# Patient Record
Sex: MALE | URBAN - METROPOLITAN AREA
[De-identification: names, ages, dates, MRNs, and addresses within clinical notes are randomized per-mention and may not be internally consistent; named-entity substitution may affect disease eponyms.]

---

## 2017-03-06 ENCOUNTER — TELEPHONE (OUTPATIENT)
Dept: NEUROLOGY | Facility: CLINIC | Age: 67
End: 2017-03-06

## 2017-03-06 NOTE — TELEPHONE ENCOUNTER
Called patient back and LVM stating there is nothing available the week of 5/1-5/5 but that I would hold an appointment for him on 5/9 at 2:10. I asked that they return the call by tomorrow or I will need to release it.

## 2017-03-06 NOTE — TELEPHONE ENCOUNTER
----- Message from Viridiana Lemons sent at 3/6/2017  2:24 PM CST -----  Regarding: Pt and wife calling about apt issues  Contact: [phone]  Pt and Pt wife calling about 5/1/17 apt with Dr Jung. Per pt wife they are traveling from [place] and with the travel will most likley not make the apt on 5/1/17. Pt wife asked if there was any way Dr. Jung would be able to see them on 5/2/17-5/5/17. FYI  I did let the pt and pt wife know that Dr. Jung was book for all those days but they insisted a message be sent over due to them traveling. Pt and wife are asking for a call back.   Pt and wife can be reached at [phone]    Thank You,  Viridiana    Please DO NOT send this message and/or reply back to sender.  Call Center Representatives DO NOT respond to messages.

## 2017-03-10 NOTE — TELEPHONE ENCOUNTER
This morning I noted there is an appointment on hold today with Dr. Jung at 2:00 pm. LVM on patient's phone asking to call and confirm or cancel that appointment if he is not planning on coming. Also LVM on cell phone as well.

## 2018-02-27 ENCOUNTER — OFFICE VISIT (OUTPATIENT)
Dept: PODIATRY | Facility: CLINIC | Age: 68
End: 2018-02-27
Payer: MEDICARE

## 2018-02-27 VITALS
HEIGHT: 69 IN | WEIGHT: 165 LBS | HEART RATE: 64 BPM | SYSTOLIC BLOOD PRESSURE: 138 MMHG | BODY MASS INDEX: 24.44 KG/M2 | DIASTOLIC BLOOD PRESSURE: 89 MMHG

## 2018-02-27 DIAGNOSIS — I73.9 PVD (PERIPHERAL VASCULAR DISEASE): ICD-10-CM

## 2018-02-27 DIAGNOSIS — I87.2 VENOUS INSUFFICIENCY OF BOTH LOWER EXTREMITIES: Primary | ICD-10-CM

## 2018-02-27 DIAGNOSIS — G20.A1 PARKINSON'S DISEASE: ICD-10-CM

## 2018-02-27 DIAGNOSIS — B35.1 ONYCHOMYCOSIS: ICD-10-CM

## 2018-02-27 PROCEDURE — 99203 OFFICE O/P NEW LOW 30 MIN: CPT | Mod: 25,S$PBB,, | Performed by: PODIATRIST

## 2018-02-27 PROCEDURE — 99999 PR PBB SHADOW E&M-NEW PATIENT-LVL III: CPT | Mod: PBBFAC,,, | Performed by: PODIATRIST

## 2018-02-27 PROCEDURE — 11721 DEBRIDE NAIL 6 OR MORE: CPT | Mod: PBBFAC,PO | Performed by: PODIATRIST

## 2018-02-27 PROCEDURE — 99203 OFFICE O/P NEW LOW 30 MIN: CPT | Mod: PBBFAC,PO | Performed by: PODIATRIST

## 2018-02-27 NOTE — PROGRESS NOTES
"Subjective:      Patient ID: Kolton Crystal is a 67 y.o. male.    Chief Complaint: No chief complaint on file.    Kolton is a 67 y.o. male who presents to the clinic for evaluation and treatment of high risk feet. Kolton has no past medical history on file. Pt with hx of parkinson's disease.The patient's chief complaint is long, thick toenails. This patient has documented high risk feet requiring routine maintenance secondary to peripheral vascular disease. And neuropathy. Denies pain. No other pedal complaints at si time.     PCP: Primary Doctor No  Melina Weldon MD  Date Last Seen by PCP: 2/20/18    Current shoe gear:  Affected Foot: Casual shoes     Unaffected Foot: Casual shoes    Last encounter in this department: Visit date not found    No results found for: HGBA1C    Vitals:    02/27/18 0820   BP: 138/89   Pulse: 64   Weight: 74.8 kg (165 lb)   Height: 5' 9" (1.753 m)   PainSc: 0-No pain      No past medical history on file.    No past surgical history on file.    No family history on file.    Social History     Social History    Marital status:      Spouse name: N/A    Number of children: N/A    Years of education: N/A     Social History Main Topics    Smoking status: Not on file    Smokeless tobacco: Not on file    Alcohol use Not on file    Drug use: Unknown    Sexual activity: Not on file     Other Topics Concern    Not on file     Social History Narrative    No narrative on file       No current outpatient prescriptions on file.     No current facility-administered medications for this visit.        Review of patient's allergies indicates:  No Known Allergies    Review of Systems   Skin: Positive for dry skin, nail changes and unusual hair distribution.   All other systems reviewed and are negative.          Objective:      Physical Exam   Constitutional: He is oriented to person, place, and time. No distress.   Cardiovascular:   Pulses:       Dorsalis pedis pulses are 0 on the right " side, and 0 on the left side.        Posterior tibial pulses are 1+ on the right side, and 1+ on the left side.   DP pulse absent, bisi. PT pulse 1/4, bisi. CRT 2- 3 seconds to digits 1-5,bisi. Hair is absent to foot/leg, bisi. Skin temperature is warm to cool, bisi foot.   Musculoskeletal: He exhibits no edema or tenderness.   Hallux limitus bilateral foot.    No pain with ROM or MMT bilateral foot.       Neurological: He is alert and oriented to person, place, and time. A sensory deficit is present.   Sensation intact to digits via SWMF. Vibratory sensation diminished, bisi   Skin: Skin is warm, dry and intact. Capillary refill takes 2 to 3 seconds. No bruising, no ecchymosis and no rash noted. He is not diaphoretic. No cyanosis or erythema. Nails show no clubbing.   Skin is warm to cool bilateral foot and very dry, bisi. No open lesions or SOI noted, bisi.  Nails are elongated 3-4 mm and dystrophic/discolored yellow 1-5, bisi.     No open lesions or macerations bilateral lower extremity.    Skin is thin and atrophied bilateral lower extremity.       Psychiatric: He has a normal mood and affect. His behavior is normal. Judgment and thought content normal.             Assessment:       Encounter Diagnoses   Name Primary?    Venous insufficiency of both lower extremities Yes    PVD (peripheral vascular disease)     Parkinson's disease     Onychomycosis          Plan:       Diagnoses and all orders for this visit:    Venous insufficiency of both lower extremities  -     Foot Care    PVD (peripheral vascular disease)  -     Foot Care    Parkinson's disease    Onychomycosis  -     Foot Care      I counseled the patient on his conditions, their implications and medical management.    - Shoe inspection. Patient instructed on proper foot hygeine. We discussed wearing proper shoe gear, daily foot inspections, never walking without protective shoe gear, never putting sharp instruments to feet, routine podiatric nail visits every  2-3 months.      - With patient's permission, nails were aggressively reduced and debrided x 10 without incident, see procedure note.     - Pt to RTC in 3 mo for routine foot care    Shea Pagan, PGY2    I have personally taken the history and examined this patient and agree with the resident's note as stated as above.   Addison Gardiner DPM, FACFAS

## 2018-02-27 NOTE — PROCEDURES
"Routine Foot Care  Date/Time: 2/27/2018 8:43 AM  Performed by: RAKEL KEBEDE  Authorized by: RAKEL KEBEDE     Time out: Immediately prior to procedure a "time out" was called to verify the correct patient, procedure, equipment, support staff and site/side marked as required.    Consent Done?:  Yes (Verbal)    Nail Care Type:  Debride  Location(s): All  (Left 1st Toe, Left 3rd Toe, Left 2nd Toe, Left 4th Toe, Left 5th Toe, Right 1st Toe, Right 2nd Toe, Right 3rd Toe, Right 4th Toe and Right 5th Toe)  Patient tolerance:  Patient tolerated the procedure well with no immediate complications      "

## 2018-02-28 ENCOUNTER — TELEPHONE (OUTPATIENT)
Dept: PODIATRY | Facility: CLINIC | Age: 68
End: 2018-02-28

## 2018-02-28 NOTE — TELEPHONE ENCOUNTER
----- Message from Tracy Dolan sent at 2/28/2018  3:42 PM CST -----  Contact: wife Genesis [phone]  Patient's spouse is returning your call. Please advise.

## 2018-06-06 ENCOUNTER — RECORDS - HEALTHEAST (OUTPATIENT)
Dept: LAB | Facility: CLINIC | Age: 68
End: 2018-06-06

## 2018-06-07 LAB
25(OH)D3 SERPL-MCNC: 14.6 NG/ML (ref 30–80)
MAGNESIUM SERPL-MCNC: 2.1 MG/DL (ref 1.8–2.6)
PSA SERPL-MCNC: 2 NG/ML (ref 0–4.5)
VIT B12 SERPL-MCNC: 294 PG/ML (ref 213–816)

## 2018-06-12 NOTE — PROGRESS NOTES
Summary and Recommendations:     Parkinson disease with dementia - seen initially by me in . Duration of PD is at least 7-8 yrs.   Both parents probably had parkinson  No one else in family has manifested with parkinson  There is a history of psychotic disorder in a brother who committed suicide and two sisters had breast cancer    Mobility wise he is fairly functional based on his gait and movement with his present sinemet medication regimen of 2 tabs 3/day    His cognition is affected with bradyphrenia - slowed responses and he also has memory impairment. He appears to have dementia and this may be parkinson disease with dementia (PDD) vs parkinson with alzheimer - mixed condition. PDD vs lewy body disease is a somewhat arbitrary separation and not necessarily that important in management    His behavior has improved from when he was living in [facility] and where he is now there are not these issues    His hallucinations persist but are not disabling and he is not on seroquel (quetiapine) or nuplazid (pimavanserin).  We discussed these medications briefly but will not prescribe either of them. Nonetheless both can affect the QTc interval so will get an ECG today for baseline QTc.    He had a spell of intermittent atrial fibrillation - and had monitoring in [facility] - they have not had a followup on this with cardiology at the [facility] or at the . Will get an ECG today.     Signed up his wife Jesica Bates for proxy access to his medical records. Silvino lives in [facility] and she lives in [facility] and visits twice monthly.     They wish to come back for ongoing care.       Arash Jung MD  _____________________________________________________________________  PATIENT: Silvino Bates  67 year old male   : 1950  ZACH: 2018    Consult requested by patient/other  Outside records reviewed and revealed  - inserted.   History obtained from  patient  History of Present Illness  68 yo man with parkinson  Previously seen by me in 2012  Has seen by Julia  [facility]     Parkinson's disease - seen initially by jairo 7/15/2011 and had been diagnosed with PD by Dr. morrison in 2011 or so.  He had tremor as the most disabling problem at the time he was initially seen    Since the initial evaluation and over the past year or two  He has had a fall out of bed and was scanned as he hit his head.   He was having lots of hallucinations -   He has some now  He is sleeping better with remeron  He has had diarrhea from c diff and is on medication for this.   As a result he is no longer on medications for constipation    Ros  Reading glasses  Hearing is not very good - will be seeing audiology coming up and it is time for new hearing aides.   Doing physical therapy  Voice is affected  States he may have done speech therapy in the past  He has had diarrhea from the c diff  He is no longer on crestor - for his cholesterol - he has a history of elevated cholesterol.   He has not been on this for a w hile  His weight has stabilized  He does not know his blood sugar or thyroid status.   Id - c diff. Had uti December 2017  Allergies - list reviewed. Yellow dye ? Also had problems with atorvastatin and simvastinn  He is taking yellow sinemet tablets = probably does not have a problem with yellow.  He has had vitamin d deficiency   Lungs are fine  Blood pressure is okay today  He had been on medication in the past to RAISE his blood pressure but is no longer on this medication  He is not sure if he is fainting  He has had cognitive problems  He does not know when he had an ECG in the past  When he was hospitalized for c diff - he did have a bout of afib in march 2018  This presumably has resolved and he is not on aspirin or coumadin  He had shoulder problems in the past. Right shoulder  Right knee cyst removed.   He has had left shoulder  problems now.   He is sleeping better with remeron - he is snoring a bit.   Has a history of skin cancer - basal cell and eczema and seborrhea    Medications            Acetaminophen 325mg         Carbidopa/levodopa 25/100 2 2 2     Cyanocobalamin vitamin b12 100mcg         Desonide desowen 0.05%        Docusate sodium colace 100mg prn       Donepezil aricept 10mg or 5mg        Hydrocortisone scalp relief anti-itch 1% soln        Ketoconazole nizoral 2% cream        Ketoconazole nizoral 2% shampoo        Linaclotide linzess 145mcg capsule        Midodrine proamatine 2.5mg        Mirtazapine 30mg        Ondansetron zofran 4mg        Polyethylene glycol miralax        Pyrithione zin 2% shampoo        Rosuvastatin calcium crestor         Sennosides senokot 8.6mg         Vitamin D 5000 units                                                                                                                                          For physical therapy  [email or web address]   Phone: [phone]   [facility]   [address] [place] [address] [place]   For speech therapy  Sarah [email or web address]   Phone: [phone]   [facility]   [address] [place] [address] [place]   Or      Lynn [email or web address]   Phone: [phone]   [facility]   [address] [place] [address] [place]     14 Review of systems  are negative except for   Patient Active Problem List   Diagnosis     Tremor     Hypercholesterolemia     Other seborrheic dermatitis     Psoriasis     Snores     GERD (gastroesophageal reflux disease)     Seasonal affective disorder (H)     Wears glasses     Voice disorders     Hearing loss     Tinnitus     Memory loss     Arthritis of hand, left     Shoulder pain, right     Family history of Parkinson's disease     Parkinson's disease (H)     Basal cell carcinoma of skin        Allergies   Allergen  Reactions     Pramipexole      Yellow Dye      Present in Sinemet     Past Surgical History:   Procedure Laterality Date     ENT SURGERY  25 yrs ago    thyroglossal cyst removed     GI SURGERY  2005    esophageal repair - nissen ? laparoscopic for reflux 2005     HERNIA REPAIR  2003    double hernia repair     KNEE SURGERY  23 yrs ago    thryoglossal cyst in right knee removed     SHOULDER SURGERY  march 2012    right rotator cuff - outopatient in [place] [facility]     Past Medical History:   Diagnosis Date     Arthritis of hand, left 7/15/2011     Basal cell carcinoma of skin 6/14/2012     Family history of Parkinson's disease 7/15/2011     GERD (gastroesophageal reflux disease) 7/15/2011     Hearing loss 7/15/2011     Hypercholesterolemia 7/15/2011     Memory loss 7/15/2011     Other seborrheic dermatitis 7/15/2011     Parkinson's disease (H) 12/7/2011     Psoriasis 7/15/2011     Seasonal affective disorder (H) 7/15/2011     Shoulder pain, right 7/15/2011     Snores 7/15/2011     Tinnitus 7/15/2011     Tremor 7/14/2011     Voice disorders 7/15/2011     Wears glasses 7/15/2011     Social History     Social History     Marital status:      Spouse name: N/A     Number of children: N/A     Years of education: N/A     Occupational History     Not on file.     Social History Main Topics     Smoking status: Former Smoker     Smokeless tobacco: Never Used      Comment: quit 1980     Alcohol use Yes      Comment: very little     Drug use: Not on file     Sexual activity: Not on file     Other Topics Concern     Not on file     Social History Narrative    Wife is from [place] and had a 1/3 of her stomach removed and is going back for more tests    Living in [place]    Has 2 daughters - adult    This is second marriage.    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     He has  problems carrying on converstation             Family History   Problem Relation Age of Onset     HEART DISEASE Mother      Parkinsonism Mother      MICROGRAPHIA     HEART DISEASE Father      Parkinsonism Father      TREMOR, REST AND ?     CANCER Sister      breast     CANCER Sister      breast     Psychotic Disorder Brother      schizophrenia; committed suicide; had depression     Current Outpatient Prescriptions   Medication Sig Dispense Refill     desonide (DESOWEN) 0.05 % cream Apply 0.5 inches topically 2 times daily.       Hydrocortisone (SCALP RELIEF ANTI-ITCH) 1 % SOLN Externally apply  topically as needed.       ketoconazole (NIZORAL) 2 % shampoo Apply  topically daily as needed for itching. Apply to the affected area and wash off after 5 minutes 210 mL 1     polyethylene glycol (MIRALAX) powder Take 17 g by mouth daily as needed. 510 g 1     Pyrithione Zinc 2 % SHAM Externally apply  topically. Use as directed 1 Bottle 11     Rosuvastatin Calcium (CRESTOR PO) Take 1 tablet by mouth daily.       sennosides (SENOKOT) 8.6 MG tablet Take 1-2 tablets by mouth 2 times daily as needed for constipation. 120 tablet 11     Examination  B/P: Data Unavailable, T: Data Unavailable, P: Data Unavailable, R: Data Unavailable 0 lbs 0 oz  There were no vitals taken for this visit., There is no height or weight on file to calculate BMI.    Vitals signs were added and reviewed if not above. Please refer to the chart from this visit.    General examination: well developed, nourished and normal affect  Carotid: No bruits. Chest CTA, Heart regular without gallops or murmurs. Abdomen soft nontender, no masses, bowel sounds intact. Periphery: normal pulses without edema. No skin lesions. MENTAL STATUS:  Alert, oriented to year 2018 but thinks it is October and it is Thursday - it is Friday June 22.   Speech fluent with normal naming, repetition, comprehension.  Good right-left orientation, Can remember 0/3 objects.  Can spell  world forwards.  CRANIAL NERVES:  Disks flat. Pupils are equal, round, reactive to light.  Normal vascularity and fields. Extraocular movements full.  Facial sensation and movement normal.  Hearing intact. Palate moves symmetrically.  Tongue midline.  Sternocleidomastoid and trapezius strength intact.  Neck strength was normal.  NEUROLOGIC:  Tone: marked increased tone on the left moderate increase on the right. Motor in upper and lower extremities. 5/5.  Reflexes 0/4.  Toe signs downgoing.  Good finger-nose-finger, fine finger movement, heel-shin maneuver, sensation to light touch, position sense and vibration and temperature was normal. Gait normal except for stooped with reduced left>right arm swing. Romberg and postural stability  - impairment with multiple 44 step recovery on the pull test. Tremor  - resting tremor of the left hand. And resting tremor of the right foot.       ______________________________________________________________________________________________    From past evaluations.       Return movement disorders     Saw dr. bañuelos in February and due to rash he was switched to ropinirole  Had rotator cuff shoulder in march and was recuperating. He was off ropinirole for a while and not sure if it is helping at all.      He has a bit of rash on his neck and the inside of both of his legs. It is visible after he gets out of the shower and is not uncomfortable on the legs. It is tender/sensitive but not itchy.      He has the rash that is present on the      Tremor is more on he left side: he is taking 1 mg 3 times per day.   He has some sleepiness from the requip. He can get hyper from the medication if the dose overlaps.     mirapex caused a rash as well as the sinemet which caused a welt on the neck. The present rash is not that uncomfortable.      Cc: 61 year old male   Issues discussed today        Patient was asked about 14 Review of systems including changes in vision (dry eyes, double  vision), hearing, heart, lungs, musculoskeletal, depression, anxiety, snoring, RBD, insomnia, urinary frequency, urinary urgency, constipation, swallowing problems, hematological, ID, allergies, skin problems: seborrhea, endocrinological: thyroid, diabetes, cholesterol; balance, weight changes, and other neurological problems and these were not significant at this time except for       Patient Active Problem List   Diagnoses     Tremor     Hypercholesterolemia     Other seborrheic dermatitis     Psoriasis     Snores     GERD (GASTROESOPHAGEAL REFLUX DISEASE)     Seasonal affective disorder     Wears glasses     Voice disorders     Hearing loss     Tinnitus     Memory loss     Arthritis of hand, left     Shoulder pain, right     Family history of Parkinson's disease     Parkinson's disease     Basal cell carcinoma of skin                   Allergies   Allergen Reactions     Yellow Dye         Present in Sinemet       Past Surgical History          Past Surgical History   Procedure Date     Gi surgery 2005       esophageal repair - nissen ? laparoscopic for reflux 2005     Hernia repair 2003       double hernia repair     Ent surgery 25 yrs ago       thyroglossal cyst removed     Knee surgery 23 yrs ago       thryoglossal cyst in right knee removed     Shoulder surgery march 2012       right rotator cuff - outopatient in [facility]          Past Medical History         Past Medical History   Diagnosis Date     Tremor 7/14/2011     Hypercholesterolemia 7/15/2011     Other seborrheic dermatitis 7/15/2011     Psoriasis 7/15/2011     Snores 7/15/2011     GERD (gastroesophageal reflux disease) 7/15/2011     Seasonal affective disorder 7/15/2011     Wears glasses 7/15/2011     Voice disorders 7/15/2011     Hearing loss 7/15/2011     Tinnitus 7/15/2011     Memory loss 7/15/2011     Arthritis of hand, left 7/15/2011     Shoulder pain, right 7/15/2011     Family history of Parkinson's disease 7/15/2011     Parkinson's  "disease 12/7/2011     Basal cell carcinoma of skin 6/14/2012          Social History    History            Social History     Marital Status:        Spouse Name: N/A       Number of Children: N/A     Years of Education: N/A          Occupational History     Not on file.             Social History Main Topics     Smoking status: Former Smoker     Smokeless tobacco: Never Used     Comment: quit 1980       Alcohol Use: Yes         very little     Drug Use: Not on file     Sexually Active: Not on file           Other Topics Concern     Not on file          Social History Narrative     Wife is from [place] and had a 1/3 of her stomach removed and is going back for more [facility] 2 daughters - adultThis is second marriage.He is going to give up his job and has problems handling the programmingHe has a 100% disability for parkinson's disease from [facility] a some employer disability that he is seeking. He has problems carrying on converstation            B/P: 143/98, T: Data Unavailable, P: 68, R: Data Unavailable 178 lbs 0 oz  Blood pressure 143/98, pulse 68, height 1.753 m (5' 9\"), weight 80.74 kg (178 lb)., Body mass index is 26.29 kg/(m^2).      History obtained from patient     Over 50% of this visit was spent in patient care and care coordination. Total visit times was 25 minutes     Summary and Recommendations:      Parkinsons disease  Needs big and loud therapy at [facility] in [place] - this was ordered through the [facility] and [facility].   dr. bañuelos may have ordered this as well.     The reported allergic reactions should be reviewed again as it appeared to be focal - neck and may have affected his eyes.      He is taking only 1mg tid of requip which is probably not high enough to get benefit and presently only having some side effects. Need dr. bañuelos to review this medication and decide where to go.     Medication options:  Consider once daily requip xl to see if lessens the " drowsiness  Consider trial of sinemet 10/100 or 25/250 blue coloration of tablets which does not have the yellow dye that may have caused his rash. The skin changes seems to be seborrhea, rosacea, eczema and psoriasis and not clear if it is a drug rash. The yellow sinemet may caused the other issues.   He has not been on amantadine or selegiline or rasasgiline as well  Filled out disability paperwork for his work.   He is presently disabled due to the cognitive slowness, bradyphrenia, tremor, slowness and stiffness,e tc.     This note will allow kee bañuelos to review his medications and help determine if it is worth trying the blue colored sinemet at some point -- 10/100 or 25/250 tablets or trying the requip xl daily to address the sleepiness from the medication.     Return back in x months if desired by patient.            Arash Jung MD       Cc: 61 year old male   Issues discussed today     Would wake up with altered sensation in his hands/feet. He had these on the mirapex: 2 tabs 3 times per day. He is taking his medication every now and then. He noticed that a few days after discontinuing the medication his memory was better.     Vision: wears glasses. Went to  Eye doctor and given a new rx  Hearing: [facility]. Was in the [employer]. Has 10% hearing loss and 30% parkinson's disease connection from the [facility].   Heart: fine blood pressure  Pulm: okay  MSK: right shoulder - rotator cuff tear and have these addressed in the spring 2012. Had left shoulder problem in the past that are better. Had a cyst in the right knee in the past.  Mood: has history of SAD. He does have a lot of desire  GI: come constipation. Not taking anything presently but is interested in ttaking something.  : nocturia 1-2 times per night. No prominent issues with bladder during day.  Derm: has some rx shampoo probably uses this daily when bad then t-gel. Uses desonide for facial flaking. Discussed several shampoo  options. May use products every other day or every few days.   Has had some voice changes. Has not had voice therapy  Has problems sleeping due to imobility.  Mood issues are present  Gets chilled easily. Wears stocking cap.  Has some occasional drooling.  Has some fatigue  Has some stomache gas.  Has tremors on the left side.        Wife will be going to [place] this February. She is from there.              Patient was asked about 14 Review of systems including changes in vision (dry eyes, double vision), hearing, heart, lungs, musculoskeletal, depression, anxiety, snoring, RBD, insomnia, urinary frequency, urinary urgency, constipation, swallowing problems, hematological, ID, allergies, skin problems: seborrhea, endocrinological: thyroid, diabetes, cholesterol; balance, weight changes, and other neurological problems and these were not significant at this time except for Patient Active Problem List   Diagnoses Code     Tremor 781.0AC     Hypercholesterolemia 272.0G     Other seborrheic dermatitis 690.18     Psoriasis 696.1V     Snores 786.09CA     GERD (GASTROESOPHAGEAL REFLUX DISEASE) 530.81K     Seasonal affective disorder 296.99P     Wears glasses V49.89BE     Voice disorders 784.40H     Hearing loss 389.9AB     Tinnitus 388.30E     Memory loss 780.93     Arthritis of hand, left 716.94T     Shoulder pain, right 719.41AP     Family history of Parkinson's disease V17.2AR     Parkinson's disease 332.0AB             No Known Allergies   Past Surgical History          Past Surgical History   Procedure Date     Gi surgery 2005       esophageal repair - nissen ? laparoscopic for reflux 2005     Hernia repair 2003       double hernia repair     Ent surgery 25 yrs ago       thyroglossal cyst removed     Knee surgery 23 yrs ago       thryoglossal cyst in right knee removed             Past Medical History         Past Medical History   Diagnosis Date     Tremor 7/14/2011     Hypercholesterolemia 7/15/2011     Other  "seborrheic dermatitis 7/15/2011     Psoriasis 7/15/2011     Snores 7/15/2011     GERD (gastroesophageal reflux disease) 7/15/2011     Seasonal affective disorder 7/15/2011     Wears glasses 7/15/2011     Voice disorders 7/15/2011     Hearing loss 7/15/2011     Tinnitus 7/15/2011     Memory loss 7/15/2011     Arthritis of hand, left 7/15/2011     Shoulder pain, right 7/15/2011     Family history of Parkinson's disease 7/15/2011     Parkinson's disease 12/7/2011             Social History                History                 Social History       Marital Status:          Spouse Name: N/A         Number of Children: N/A       Years of Education: N/A                Occupational History         Not on file.                 Social History Main Topics     Smoking status: Former Smoker     Smokeless tobacco: Never Used     Comment: quit 1980         Alcohol Use: Yes         very littel     Drug Use: Not on file     Sexually Active: Not on file   Other Topics Concern        Not on file          Social History Narrative     No narrative on file               B/P: 130/89, T: Data Unavailable, P: 78, R: Data Unavailable 180 lbs 0 oz  Blood pressure 130/89, pulse 78, height 1.753 m (5' 9\"), weight 81.647 kg (180 lb)., Body mass index is 26.58 kg/(m^2).      History obtained from patient and family     Over 50% of this visit was spent in patient care and care coordination. Total visit times was 40 m inutes     Summary and Recommendations:      Parkinson's disease - has a variety of issues     Skin issues: discussed and provided Rx for hair products  Constipation: dietary changes and miralax and senokot options  MObility issues; would try meds - see below and get pt and ot   Trial of sinemet 25/100  1 tablet daily for 3-7 days then 1 tab twice daily for 7 days then consider increasing to 1 tab 3 times per day.     Speech therapy and physical therapy. Will need referral from Dr. Lorin Chavez to ensure that he has " coverage.   Absolutely would benefit from BIG and LOUD therapy that has been specifically developed for individuals with parkinson's disease. Providers are listed on the [employer] website and include people in [facility] in [place]. Unclear if there is anyone certified in [place].      Return back in 6 months. May want to begin care at [facility] as well.      For physical therapy  [email or web address]   Phone: [phone]   [facility]   [address] [place] [address]    For speech therapy  Sarah [email or web address]   Phone: [phone]   [facility]   [address] [place] [address] [place]   Or      Lynn [email or web address]   Phone: [phone]   [facility]   [address] [place] [address] [place]     Arash Jung MD      __________________________________________________________      Cc; parkinson second opinion  60 year old man with left sided onse parkinson.     Had seen Dr. Kelly who diagnosed his parkinson's disease. The tremors are the most disabling problem. He had received a Rx for trihexyphenidyl and began 2mg tabs 1/2 tab daily then increased to 1/2 tab twice daily. He has been taking this since April 2011. There is a some slight benefit but not dramatic especially when doing a task the tremor comes out.      Wants to know what to do with the artane; should it be increased. In the past 2 or 3 months his thinking is not as clear. Has memory lapses. He is unsure if med related.     Agent orange question     Work related - disability issues     Physical therapy     DRIVING  TALKING        Cc: 60 year old male           14 Review of systems  are negative except for Patient Active Problem List   Diagnoses Code     Tremor 781.0AC     Hypercholesterolemia 272.0G     Other seborrheic dermatitis 690.18     Psoriasis 696.1V     Snores 786.09CA     GERD (GASTROESOPHAGEAL REFLUX  DISEASE) 530.81K     Seasonal affective disorder 296.99P     Wears glasses V49.89BE     Voice disorders 784.40H     Hearing loss 389.9AB     Tinnitus 388.30E     Memory loss 780.93     Arthritis of hand, left 716.94T     Shoulder pain, right 719.41AP          No Known Allergies   Past Surgical History          Past Surgical History   Procedure Date     Gi surgery 2005       esophageal repair - nissen ? laparoscopic for reflux 2005     Hernia repair 2003       double hernia repair     Ent surgery 25 yrs ago       thyroglossal cyst removed     Knee surgery 23 yrs ago       thryoglossal cyst in right knee removed             Past Medical History         Past Medical History   Diagnosis Date     Tremor 7/14/2011     Hypercholesterolemia 7/15/2011     Other seborrheic dermatitis 7/15/2011     Psoriasis 7/15/2011     Snores 7/15/2011     GERD (GASTROESOPHAGEAL REFLUX DISEASE) 7/15/2011     Seasonal affective disorder 7/15/2011     Wears glasses 7/15/2011     Voice disorders 7/15/2011     Hearing loss 7/15/2011     Tinnitus 7/15/2011     Memory loss 7/15/2011     Arthritis of hand, left 7/15/2011     Shoulder pain, right 7/15/2011             Social History                History                 Social History       Marital Status:          Spouse Name: N/A         Number of Children: N/A       Years of Education: N/A                Occupational History         Not on file.                 Social History Main Topics     Smoking status: Former Smoker     Smokeless tobacco: Never Used     Comment: quit 1980         Alcohol Use: Yes         very littel     Drug Use: Not on file     Sexually Active: Not on file   Other Topics Concern        Not on file          Social History Narrative     No narrative on file             Family History           Family History   Problem Relation Age of Onset     Heart Mother       Heart Father       Cancer Sister         breast      Cancer Sister         breast      Psychiatry Brother          schizophrenia; committed suicide; had depression                Examination  B/P: 133/90, T: Data Unavailable, P: 68, R: Data Unavailable 174 lbs 0 oz  General examination: well developed, nourished and normal affect  Carotid: No bruits. Chest CTA, Heart regular without gallops or murmurs. Abdomen soft nontender, no masses, bowel sounds intact. Periphery: normal pulses without edema. No skin lesions. MENTAL STATUS:  Alert, oriented x3.  Speech fluent with normal naming, repetition, comprehension.  Good right-left orientation, Can remember 1/3 objects.  5/5 on spelling world backwards CRANIAL NERVES:  Disks flat. Pupils are equal, round, reactive to light.  Normal vascularity and fields. Extraocular movements full.  Facial sensation and movement normal.  Hearing intact. Palate moves symmetrically.  Tongue midline.  Sternocleidomastoid and trapezius strength intact.  Neck strength was normal.  NEUROLOGIC:  Tone: rigidity more on the left. Motor in upper and lower extremities. 5/5.  Reflexes 2/4.  Toe signs downgoing.  Good finger-nose-finger, fine finger movement, heel-shin maneuver, sensation to light touch, position sense and vibration and temperature was normal. Gait normal except for reduced left arm swing and slightly stooped. Romberg and postural stability intact. Tremor present on the left side.      Outside records reviewed and revealed - details related to medication adjustment and scan.  History obtained from patient and family     Summary and Recommendations:      Parkinson's disease: left side onset  Agent Orange exposure in [place].  Probable link between the two and therefore he merits from service related benefits for his parkinson's disease.      He has noticed memory problems that potentially could be related to his trihexyphendyl vs. His disease.  He          Plan:     Reduce the dose of trihexyphenidyl to 1/2 tab daily for 1-2 weeks   See if memory is better     Then stop it or overlap with  starting on mirapex     Start on mirapex (pramipexole)  0.25mg  1/2 tab 3 times a day for one week  1 tab 3 times a day for one week  2 tabs 3 times a day     Other options would be to try   requip which is like mirapex  Or  Sinemet (carbidopa/levodopa)  Or   Azilect (rasagline)   Or   Selegiline (eldepryl)        [facility] PHYSICAL THERAPY at [facility]        CONSIDER NEUROPSYCHOLOGICAL EVALUATION WITH DR. HERNANDEZ AT [facility] TO ESTABLISH COGNITIVE BASELINE     followup will be determined by the patient - either here or at [facility] with Rebeka Hernandez. Jesica Gamble or Annabel Rowland                        Patient Instructions - Arash Jung MD - 04/01/2016 12:21 PM CDT  Thank you for coming in today. If you have any questions or concerns please feel free to call us at [phone]. You can also send us a message through [employer] ([employer]). If you had a lab or test today and the results were not normal or we have a concern, we will contact you.    Diagnosis/Summary/Recommendations:    Parkinson - has had symptoms for 5 yrs or so. Seen 2011.   Left side Onset.   He has tremor this is affecting his quality of life.     He had benefit from ropinirole in reducing the tremor.     He appears to have off time but not clear if it is 1-2 hours.     Started the neupro 2mg patch 2 days ago  Stopped the ropinirole 2 days ago. It was 2 mg 5 times per day   Continues on the sinemet. 1 tablet 25/100: 4 times per day  lodosyn 25mg 1 tablet as needed    He has a Rx for atropine for drooling at night.     He is not taking midodrine - states it was not effective.   His blood pressure was 134/97 today.     He has had dizziness, which has been present for a long time  He states he has had light headedness.     He had seen Dr. Dumont 3 weeks ago.     He has had falls - getting in and out of the tub.  He has had Memory problems  Vision: Wears glasses  Hearing aids. Has tinnitus.  Heart: denies  Has constipation and has GERD  Using  miralax and stool softeners  Has a history of basal cell on his face and eczema  Snores - has not had a sleep study  Endo: on crestor for cholesterol. Does not remember to take it  Denies thyroid or diabetes  Blood: denies  Allergies: statins  Id: denies  Bladder control: good - has nocturia 1/noc  Voice problems - has not done voice therapy  Has not completed a course of therapy.  Wife is retired and is back home in [place].   He takes the van to the [facility].   He would have to get transport to the  - either by airport van or the [email or web address] bus.     PLAN:  neuropsych evaluation - planned for 4/11/2016  He is more Dizzy than before and may continue on the patch for a few more Days to see if this clears up. Then may want to go off the patch for a few days to see if dizziness is better. He may ultimately want to go back on the requip he was taking 2mg 4-5 times per day.   He is scheduled to see Dr. Dumont in 3months  He has not completed his dbs workup.  Given transport issues would be tough to enroll in a istradefylline trial that is ongoing at the     Return to see me or the NP/PA.         Answers for HPI/ROS submitted by the patient on 6/22/2018   General Symptoms: Yes  Skin Symptoms: Yes  HENT Symptoms: Yes  EYE SYMPTOMS: No  HEART SYMPTOMS: No  LUNG SYMPTOMS: Yes  INTESTINAL SYMPTOMS: Yes  URINARY SYMPTOMS: No  REPRODUCTIVE SYMPTOMS: No  SKELETAL SYMPTOMS: Yes  BLOOD SYMPTOMS: No  NERVOUS SYSTEM SYMPTOMS: Yes  MENTAL HEALTH SYMPTOMS: Yes  Fever: No  Loss of appetite: No  Weight loss: No  Weight gain: No  Fatigue: No  Night sweats: No  Chills: No  Increased stress: No  Excessive hunger: No  Excessive thirst: No  Feeling hot or cold when others believe the temperature is normal: No  Loss of height: No  Post-operative complications: No  Surgical site pain: No  Hallucinations: Yes  Change in or Loss of Energy: No  Hyperactivity: No  Confusion: Yes  Changes in hair: No  Changes in moles/birth marks:  No  Itching: No  Rashes: No  Changes in nails: No  Acne: No  Change in facial hair: No  Warts: No  Non-healing sores: No  Scarring: No  Flaking of skin: Yes  Color changes of hands/feet in cold : No  Sun sensitivity: No  Skin thickening: No  Ear pain: No  Ear discharge: No  Hearing loss: Yes  Tinnitus: No  Nosebleeds: No  Congestion: No  Sinus pain: No  Trouble swallowing: No   Voice hoarseness: No  Mouth sores: No  Sore throat: No  Tooth pain: No  Gum tenderness: No  Bleeding gums: No  Change in taste: No  Change in sense of smell: No  Dry mouth: No  Hearing aid used: Yes  Neck lump: No  Cough: No  Sputum or phlegm: No  Coughing up blood: No  Difficulty breating or shortness of breath: No  Snoring: Yes  Wheezing: No  Difficulty breathing on exertion: No  Difficulty breathing when lying flat: No  Heart burn or indigestion: No  Nausea: Yes  Vomiting: No  Abdominal pain: Yes  Bloating: Yes  Constipation: Yes  Diarrhea: No  Blood in stool: No  Black stools: No  Rectal or Anal pain: No  Fecal incontinence: No  Yellowing of skin or eyes: No  Vomit with blood: No  Change in stools: No  Back pain: No  Muscle aches: No  Neck pain: No  Swollen joints: No  Joint pain: Yes  Bone pain: No  Muscle cramps: Yes  Muscle weakness: Yes  Joint stiffness: Yes  Bone fracture: No  Trouble with coordination: Yes  Dizziness or trouble with balance: Yes  Fainting or black-out spells: No  Memory loss: Yes  Headache: No  Seizures: No  Speech problems: Yes  Tingling: Yes  Tremor: Yes  Weakness: Yes  Difficulty walking: Yes  Paralysis: No  Numbness: Yes  Nervous or Anxious: Yes  Depression: Yes  Trouble sleeping: Yes  Trouble thinking or concentrating: Yes  Mood changes: Yes  Panic attacks: Yes

## 2018-06-15 NOTE — TELEPHONE ENCOUNTER
FUTURE VISIT INFORMATION      FUTURE VISIT INFORMATION:    Date: 06/22/2018    Time:     Location:   REFERRAL INFORMATION:    Referring provider:  Erich     Referring providers clinic:      Reason for visit/diagnosis          RECORDS STATUS      Internal Records, Epic, Care Everywhere.     Requested Additional [facility] Records on 06/15/2018

## 2018-06-21 RX ORDER — POLYETHYLENE GLYCOL 3350 17 G/17G
POWDER, FOR SOLUTION ORAL
COMMUNITY
End: 2018-06-22

## 2018-06-21 RX ORDER — ONDANSETRON 4 MG/1
TABLET, FILM COATED ORAL
COMMUNITY
Start: 2014-09-05 | End: 2018-06-22

## 2018-06-21 RX ORDER — DONEPEZIL HYDROCHLORIDE 10 MG/1
TABLET, FILM COATED ORAL
COMMUNITY
End: 2018-06-22

## 2018-06-21 RX ORDER — CARBIDOPA AND LEVODOPA 25; 100 MG/1; MG/1
TABLET ORAL
COMMUNITY
Start: 2015-11-06 | End: 2018-06-22

## 2018-06-21 RX ORDER — KETOCONAZOLE 20 MG/G
CREAM TOPICAL
COMMUNITY

## 2018-06-21 RX ORDER — DOCUSATE SODIUM 100 MG/1
CAPSULE, LIQUID FILLED ORAL
COMMUNITY
End: 2018-06-22

## 2018-06-21 RX ORDER — MIDODRINE HYDROCHLORIDE 2.5 MG/1
TABLET ORAL
COMMUNITY
End: 2018-06-22

## 2018-06-22 ENCOUNTER — APPOINTMENT (OUTPATIENT)
Dept: GENERAL RADIOLOGY | Facility: CLINIC | Age: 68
End: 2018-06-22
Attending: PSYCHIATRY & NEUROLOGY
Payer: COMMERCIAL

## 2018-06-22 ENCOUNTER — OFFICE VISIT (OUTPATIENT)
Dept: NEUROLOGY | Facility: CLINIC | Age: 68
End: 2018-06-22
Payer: COMMERCIAL

## 2018-06-22 ENCOUNTER — PRE VISIT (OUTPATIENT)
Dept: NEUROLOGY | Facility: CLINIC | Age: 68
End: 2018-06-22

## 2018-06-22 VITALS
BODY MASS INDEX: 22.36 KG/M2 | HEART RATE: 92 BPM | SYSTOLIC BLOOD PRESSURE: 131 MMHG | WEIGHT: 151 LBS | DIASTOLIC BLOOD PRESSURE: 75 MMHG | HEIGHT: 69 IN

## 2018-06-22 DIAGNOSIS — G20.C PARKINSONISM, UNSPECIFIED PARKINSONISM TYPE (H): Primary | ICD-10-CM

## 2018-06-22 DIAGNOSIS — D22.9 CHANGE IN MOLE: ICD-10-CM

## 2018-06-22 DIAGNOSIS — L21.9 SEBORRHEIC DERMATITIS: ICD-10-CM

## 2018-06-22 DIAGNOSIS — G20.C PARKINSONISM, UNSPECIFIED PARKINSONISM TYPE (H): ICD-10-CM

## 2018-06-22 DIAGNOSIS — I48.0 INTERMITTENT ATRIAL FIBRILLATION (H): ICD-10-CM

## 2018-06-22 RX ORDER — NYSTATIN 100000 U/G
OINTMENT TOPICAL
COMMUNITY
Start: 2018-06-22

## 2018-06-22 RX ORDER — DONEPEZIL HYDROCHLORIDE 10 MG/1
TABLET, FILM COATED ORAL
Qty: 30 TABLET | COMMUNITY
Start: 2018-06-22 | End: 2018-11-20

## 2018-06-22 RX ORDER — CARBIDOPA AND LEVODOPA 25; 100 MG/1; MG/1
TABLET ORAL
Qty: 90 TABLET | COMMUNITY
Start: 2018-06-22 | End: 2018-11-20

## 2018-06-22 RX ORDER — CARBOXYMETHYLCELLULOSE SODIUM 5 MG/ML
SOLUTION/ DROPS OPHTHALMIC
Qty: 1 BOTTLE | COMMUNITY
Start: 2018-06-22

## 2018-06-22 RX ORDER — TRIAMCINOLONE ACETONIDE 5 MG/G
CREAM TOPICAL
COMMUNITY
Start: 2018-06-22

## 2018-06-22 RX ORDER — VANCOMYCIN HYDROCHLORIDE 750 MG/150ML
INJECTION, SOLUTION INTRAVENOUS
COMMUNITY
Start: 2018-06-22 | End: 2018-11-20

## 2018-06-22 RX ORDER — MIRTAZAPINE 30 MG/1
TABLET, FILM COATED ORAL
Qty: 30 TABLET | COMMUNITY
Start: 2018-06-22

## 2018-06-22 RX ORDER — ACETAMINOPHEN 325 MG/1
TABLET ORAL
Qty: 100 TABLET | COMMUNITY
Start: 2018-06-22

## 2018-06-22 ASSESSMENT — ENCOUNTER SYMPTOMS
CONSTIPATION: 1
SORE THROAT: 0
BLOOD IN STOOL: 0
TINGLING: 1
MEMORY LOSS: 1
WEAKNESS: 1
BOWEL INCONTINENCE: 0
DIARRHEA: 0
FEVER: 0
POSTURAL DYSPNEA: 0
HEADACHES: 0
ALTERED TEMPERATURE REGULATION: 0
NERVOUS/ANXIOUS: 1
WEIGHT LOSS: 0
NAIL CHANGES: 0
PARALYSIS: 0
INSOMNIA: 1
ARTHRALGIAS: 1
NECK MASS: 0
SINUS PAIN: 0
JAUNDICE: 0
TASTE DISTURBANCE: 0
WEIGHT GAIN: 0
MUSCLE CRAMPS: 1
SHORTNESS OF BREATH: 0
POLYPHAGIA: 0
HEMOPTYSIS: 0
PANIC: 1
POOR WOUND HEALING: 0
HEARTBURN: 0
FATIGUE: 0
SEIZURES: 0
DYSPNEA ON EXERTION: 0
SPEECH CHANGE: 1
ABDOMINAL PAIN: 1
DISTURBANCES IN COORDINATION: 1
NECK PAIN: 0
MUSCLE WEAKNESS: 1
WHEEZING: 0
MYALGIAS: 0
RECTAL PAIN: 0
HOARSE VOICE: 0
TREMORS: 1
NAUSEA: 1
DEPRESSION: 1
BLOATING: 1
SINUS CONGESTION: 0
BACK PAIN: 0
SMELL DISTURBANCE: 0
SNORES LOUDLY: 1
JOINT SWELLING: 0
NUMBNESS: 1
INCREASED ENERGY: 0
DECREASED CONCENTRATION: 1
VOMITING: 0
SKIN CHANGES: 0
COUGH: 0
DECREASED APPETITE: 0
HALLUCINATIONS: 1
STIFFNESS: 1
DIZZINESS: 1
TROUBLE SWALLOWING: 0
LOSS OF CONSCIOUSNESS: 0
CHILLS: 0
SPUTUM PRODUCTION: 0
NIGHT SWEATS: 0
POLYDIPSIA: 0

## 2018-06-22 ASSESSMENT — PAIN SCALES - GENERAL: PAINLEVEL: MILD PAIN (2)

## 2018-06-22 NOTE — NURSING NOTE
Chief Complaint   Patient presents with     Consult     [facility] NEW - CONSULT MOVEMENT DISORDER     Michelle Paredes MA

## 2018-06-22 NOTE — LETTER
2018      RE: Silvino Bates  [address]  [facility] [address]         Summary and Recommendations:     Parkinson disease with dementia - seen initially by me in . Duration of PD is at least 7-8 yrs.   Both parents probably had parkinson  No one else in family has manifested with parkinson  There is a history of psychotic disorder in a brother who committed suicide and two sisters had breast cancer    Mobility wise he is fairly functional based on his gait and movement with his present sinemet medication regimen of 2 tabs 3/day    His cognition is affected with bradyphrenia - slowed responses and he also has memory impairment. He appears to have dementia and this may be parkinson disease with dementia (PDD) vs parkinson with alzheimer - mixed condition. PDD vs lewy body disease is a somewhat arbitrary separation and not necessarily that important in management    His behavior has improved from when he was living in [facility]) and where he is now there are not these issues    His hallucinations persist but are not disabling and he is not on seroquel (quetiapine) or nuplazid (pimavanserin).  We discussed these medications briefly but will not prescribe either of them. Nonetheless both can affect the QTc interval so will get an ECG today for baseline QTc.    He had a spell of intermittent atrial fibrillation - and had monitoring in [facility] - they have not had a followup on this with cardiology at the [facility] or at the . Will get an ECG today.     Signed up his wife Jesica Bates for proxy access to his medical records. Silvino lives in [facility] and she lives in [facility] and visits twice monthly.     They wish to come back for ongoing care.       Arash Jung MD  _____________________________________________________________________  PATIENT: Silvino Bates  67 year old male   : 1950  ZACH: 2018    Consult  requested by patient/other  Outside records reviewed and revealed  - inserted.   History obtained from patient  History of Present Illness  68 yo man with parkinson  Previously seen by me in 2012  Has seen by Julia  [facility]     Parkinson's disease - seen initially by jairo 7/15/2011 and had been diagnosed with PD by Dr. morrison in 2011 or so.  He had tremor as the most disabling problem at the time he was initially seen    Since the initial evaluation and over the past year or two  He has had a fall out of bed and was scanned as he hit his head.   He was having lots of hallucinations -   He has some now  He is sleeping better with remeron  He has had diarrhea from c diff and is on medication for this.   As a result he is no longer on medications for constipation    Ros  Reading glasses  Hearing is not very good - will be seeing audiology coming up and it is time for new hearing aides.   Doing physical therapy  Voice is affected  States he may have done speech therapy in the past  He has had diarrhea from the c diff  He is no longer on crestor - for his cholesterol - he has a history of elevated cholesterol.   He has not been on this for a w hile  His weight has stabilized  He does not know his blood sugar or thyroid status.   Id - c diff. Had uti December 2017  Allergies - list reviewed. Yellow dye ? Also had problems with atorvastatin and simvastinn  He is taking yellow sinemet tablets = probably does not have a problem with yellow.  He has had vitamin d deficiency   Lungs are fine  Blood pressure is okay today  He had been on medication in the past to RAISE his blood pressure but is no longer on this medication  He is not sure if he is fainting  He has had cognitive problems  He does not know when he had an ECG in the past  When he was hospitalized for c diff - he did have a bout of afib in march 2018  This presumably has resolved and he is not on aspirin or coumadin  He had  shoulder problems in the past. Right shoulder  Right knee cyst removed.   He has had left shoulder problems now.   He is sleeping better with remeron - he is snoring a bit.   Has a history of skin cancer - basal cell and eczema and seborrhea    Medications            Acetaminophen 325mg         Carbidopa/levodopa 25/100 2 2 2     Cyanocobalamin vitamin b12 100mcg         Desonide desowen 0.05%        Docusate sodium colace 100mg prn       Donepezil aricept 10mg or 5mg        Hydrocortisone scalp relief anti-itch 1% soln        Ketoconazole nizoral 2% cream        Ketoconazole nizoral 2% shampoo        Linaclotide linzess 145mcg capsule        Midodrine proamatine 2.5mg        Mirtazapine 30mg        Ondansetron zofran 4mg        Polyethylene glycol miralax        Pyrithione zin 2% shampoo        Rosuvastatin calcium crestor         Sennosides senokot 8.6mg         Vitamin D 5000 units                                                                                                                                          For physical therapy  [email or web address]   Phone: [phone]   [facility]   [address] [place] [address] [place]   For speech therapy  Sarah [email or web address]   Phone: [phone]   [facility]   [address] [place] [address] [place]   Or      [email or web address]   Phone: [phone]   [facility]   [address] [place] [address] [place]     14 Review of systems  are negative except for   Patient Active Problem List   Diagnosis     Tremor     Hypercholesterolemia     Other seborrheic dermatitis     Psoriasis     Snores     GERD (gastroesophageal reflux disease)     Seasonal affective disorder (H)     Wears glasses     Voice disorders     Hearing loss     Tinnitus     Memory loss     Arthritis of hand, left     Shoulder pain, right     Family history of Parkinson's  disease     Parkinson's disease (H)     Basal cell carcinoma of skin        Allergies   Allergen Reactions     Pramipexole      Yellow Dye      Present in Sinemet     Past Surgical History:   Procedure Laterality Date     ENT SURGERY  25 yrs ago    thyroglossal cyst removed     GI SURGERY  2005    esophageal repair - nissen ? laparoscopic for reflux 2005     HERNIA REPAIR  2003    double hernia repair     KNEE SURGERY  23 yrs ago    thryoglossal cyst in right knee removed     SHOULDER SURGERY  march 2012    right rotator cuff - outopatient in [place] [facility]     Past Medical History:   Diagnosis Date     Arthritis of hand, left 7/15/2011     Basal cell carcinoma of skin 6/14/2012     Family history of Parkinson's disease 7/15/2011     GERD (gastroesophageal reflux disease) 7/15/2011     Hearing loss 7/15/2011     Hypercholesterolemia 7/15/2011     Memory loss 7/15/2011     Other seborrheic dermatitis 7/15/2011     Parkinson's disease (H) 12/7/2011     Psoriasis 7/15/2011     Seasonal affective disorder (H) 7/15/2011     Shoulder pain, right 7/15/2011     Snores 7/15/2011     Tinnitus 7/15/2011     Tremor 7/14/2011     Voice disorders 7/15/2011     Wears glasses 7/15/2011     Social History     Social History     Marital status:      Spouse name: N/A     Number of children: N/A     Years of education: N/A     Occupational History     Not on file.     Social History Main Topics     Smoking status: Former Smoker     Smokeless tobacco: Never Used      Comment: quit 1980     Alcohol use Yes      Comment: very little     Drug use: Not on file     Sexual activity: Not on file     Other Topics Concern     Not on file     Social History Narrative    Wife is from [place] and had a 1/3 of her stomach removed and is going back for more tests    Living in [place]    Has 2 daughters - adult    This is second marriage.    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     He has problems carrying on converstation             Family History   Problem Relation Age of Onset     HEART DISEASE Mother      Parkinsonism Mother      MICROGRAPHIA     HEART DISEASE Father      Parkinsonism Father      TREMOR, REST AND ?     CANCER Sister      breast     CANCER Sister      breast     Psychotic Disorder Brother      schizophrenia; committed suicide; had depression     Current Outpatient Prescriptions   Medication Sig Dispense Refill     desonide (DESOWEN) 0.05 % cream Apply 0.5 inches topically 2 times daily.       Hydrocortisone (SCALP RELIEF ANTI-ITCH) 1 % SOLN Externally apply  topically as needed.       ketoconazole (NIZORAL) 2 % shampoo Apply  topically daily as needed for itching. Apply to the affected area and wash off after 5 minutes 210 mL 1     polyethylene glycol (MIRALAX) powder Take 17 g by mouth daily as needed. 510 g 1     Pyrithione Zinc 2 % SHAM Externally apply  topically. Use as directed 1 Bottle 11     Rosuvastatin Calcium (CRESTOR PO) Take 1 tablet by mouth daily.       sennosides (SENOKOT) 8.6 MG tablet Take 1-2 tablets by mouth 2 times daily as needed for constipation. 120 tablet 11     Examination  B/P: Data Unavailable, T: Data Unavailable, P: Data Unavailable, R: Data Unavailable 0 lbs 0 oz  There were no vitals taken for this visit., There is no height or weight on file to calculate BMI.    Vitals signs were added and reviewed if not above. Please refer to the chart from this visit.    General examination: well developed, nourished and normal affect  Carotid: No bruits. Chest CTA, Heart regular without gallops or murmurs. Abdomen soft nontender, no masses, bowel sounds intact. Periphery: normal pulses without edema. No skin lesions. MENTAL STATUS:  Alert, oriented to year 2018 but thinks it is October and it is Thursday - it is Friday June 22.   Speech fluent with normal naming,  repetition, comprehension.  Good right-left orientation, Can remember 0/3 objects.  Can spell world forwards.  CRANIAL NERVES:  Disks flat. Pupils are equal, round, reactive to light.  Normal vascularity and fields. Extraocular movements full.  Facial sensation and movement normal.  Hearing intact. Palate moves symmetrically.  Tongue midline.  Sternocleidomastoid and trapezius strength intact.  Neck strength was normal.  NEUROLOGIC:  Tone: marked increased tone on the left moderate increase on the right. Motor in upper and lower extremities. 5/5.  Reflexes 0/4.  Toe signs downgoing.  Good finger-nose-finger, fine finger movement, heel-shin maneuver, sensation to light touch, position sense and vibration and temperature was normal. Gait normal except for stooped with reduced left>right arm swing. Romberg and postural stability  - impairment with multiple 44 step recovery on the pull test. Tremor  - resting tremor of the left hand. And resting tremor of the right foot.       ______________________________________________________________________________________________    From past evaluations.       Return movement disorders     Saw dr. bañuelos in February and due to rash he was switched to ropinirole  Had rotator cuff shoulder in march and was recuperating. He was off ropinirole for a while and not sure if it is helping at all.      He has a bit of rash on his neck and the inside of both of his legs. It is visible after he gets out of the shower and is not uncomfortable on the legs. It is tender/sensitive but not itchy.      He has the rash that is present on the      Tremor is more on he left side: he is taking 1 mg 3 times per day.   He has some sleepiness from the requip. He can get hyper from the medication if the dose overlaps.     mirapex caused a rash as well as the sinemet which caused a welt on the neck. The present rash is not that uncomfortable.      Cc: 61 year old male   Issues discussed  today        Patient was asked about 14 Review of systems including changes in vision (dry eyes, double vision), hearing, heart, lungs, musculoskeletal, depression, anxiety, snoring, RBD, insomnia, urinary frequency, urinary urgency, constipation, swallowing problems, hematological, ID, allergies, skin problems: seborrhea, endocrinological: thyroid, diabetes, cholesterol; balance, weight changes, and other neurological problems and these were not significant at this time except for       Patient Active Problem List   Diagnoses     Tremor     Hypercholesterolemia     Other seborrheic dermatitis     Psoriasis     Snores     GERD (GASTROESOPHAGEAL REFLUX DISEASE)     Seasonal affective disorder     Wears glasses     Voice disorders     Hearing loss     Tinnitus     Memory loss     Arthritis of hand, left     Shoulder pain, right     Family history of Parkinson's disease     Parkinson's disease     Basal cell carcinoma of skin                   Allergies   Allergen Reactions     Yellow Dye         Present in Sinemet       Past Surgical History          Past Surgical History   Procedure Date     Gi surgery 2005       esophageal repair - nissen ? laparoscopic for reflux 2005     Hernia repair 2003       double hernia repair     Ent surgery 25 yrs ago       thyroglossal cyst removed     Knee surgery 23 yrs ago       thryoglossal cyst in right knee removed     Shoulder surgery march 2012       right rotator cuff - [facility]opatient in [facility]          Past Medical History         Past Medical History   Diagnosis Date     Tremor 7/14/2011     Hypercholesterolemia 7/15/2011     Other seborrheic dermatitis 7/15/2011     Psoriasis 7/15/2011     Snores 7/15/2011     GERD (gastroesophageal reflux disease) 7/15/2011     Seasonal affective disorder 7/15/2011     Wears glasses 7/15/2011     Voice disorders 7/15/2011     Hearing loss 7/15/2011     Tinnitus 7/15/2011     Memory loss 7/15/2011     Arthritis of hand, left 7/15/2011      "Shoulder pain, right 7/15/2011     Family history of Parkinson's disease 7/15/2011     Parkinson's disease 12/7/2011     Basal cell carcinoma of skin 6/14/2012          Social History    History            Social History     Marital Status:        Spouse Name: N/A       Number of Children: N/A     Years of Education: N/A          Occupational History     Not on file.             Social History Main Topics     Smoking status: Former Smoker     Smokeless tobacco: Never Used     Comment: quit 1980       Alcohol Use: Yes         very little     Drug Use: Not on file     Sexually Active: Not on file           Other Topics Concern     Not on file          Social History Narrative     Wife is from [place] and had a 1/3 of her stomach removed and is going back for more tests[facility] 2 daughters - adultThis is second marriage.He is going to give up his job and has problems handling the programmingHe has a 100% disability for parkinson's disease from [facility] [employer] a some employer disability that he is seeking. He has problems carrying on converstation            B/P: 143/98, T: Data Unavailable, P: 68, R: Data Unavailable 178 lbs 0 oz  Blood pressure 143/98, pulse 68, height 1.753 m (5' 9\"), weight 80.74 kg (178 lb)., Body mass index is 26.29 kg/(m^2).      History obtained from patient     Over 50% of this visit was spent in patient care and care coordination. Total visit times was 25 minutes     Summary and Recommendations:      Parkinsons disease  Needs big and loud therapy at [facility] in [place] - this was ordered through the [facility] and [facility].   dr. bañuelos may have ordered this as well.     The reported allergic reactions should be reviewed again as it appeared to be focal - neck and may have affected his eyes.      He is taking only 1mg tid of requip which is probably not high enough to get benefit and presently only having some side effects. Need dr. bañuelos to review this medication and decide " where to go.     Medication options:  Consider once daily requip xl to see if lessens the drowsiness  Consider trial of sinemet 10/100 or 25/250 blue coloration of tablets which does not have the yellow dye that may have caused his rash. The skin changes seems to be seborrhea, rosacea, eczema and psoriasis and not clear if it is a drug rash. The yellow sinemet may caused the other issues.   He has not been on amantadine or selegiline or rasasgiline as well  Filled out disability paperwork for his work.   He is presently disabled due to the cognitive slowness, bradyphrenia, tremor, slowness and stiffness,e tc.     This note will allow kee fatimaandrade to review his medications and help determine if it is worth trying the blue colored sinemet at some point -- 10/100 or 25/250 tablets or trying the requip xl daily to address the sleepiness from the medication.     Return back in x months if desired by patient.            Arash Jung MD       Cc: 61 year old male   Issues discussed today     Would wake up with altered sensation in his hands/feet. He had these on the mirapex: 2 tabs 3 times per day. He is taking his medication every now and then. He noticed that a few days after discontinuing the medication his memory was better.     Vision: wears glasses. Went to  Eye doctor and given a new rx  Hearing: [facility]. Was in the [employer]. Has 10% hearing loss and 30% parkinson's disease connection [facility] the [facility].   Heart: fine blood pressure  Pulm: okay  MSK: right shoulder - rotator cuff tear and have these addressed in the spring 2012. Had left shoulder problem in the past that are better. Had a cyst in the right knee in the past.  Mood: has history of SAD. He does have a lot of desire  GI: come constipation. Not taking anything presently but is interested in ttaking something.  : nocturia 1-2 times per night. No prominent issues with bladder during day.  Derm: has some rx shampoo probably uses this  daily when bad then t-gel. Uses desonide for facial flaking. Discussed several shampoo options. May use products every other day or every few days.   Has had some voice changes. Has not had voice therapy  Has problems sleeping due to imobility.  Mood issues are present  Gets chilled easily. Wears stocking cap.  Has some occasional drooling.  Has some fatigue  Has some stomache gas.  Has tremors on the left side.        Wife will be going to [place] this February. She is from there.              Patient was asked about 14 Review of systems including changes in vision (dry eyes, double vision), hearing, heart, lungs, musculoskeletal, depression, anxiety, snoring, RBD, insomnia, urinary frequency, urinary urgency, constipation, swallowing problems, hematological, ID, allergies, skin problems: seborrhea, endocrinological: thyroid, diabetes, cholesterol; balance, weight changes, and other neurological problems and these were not significant at this time except for Patient Active Problem List   Diagnoses Code     Tremor 781.0AC     Hypercholesterolemia 272.0G     Other seborrheic dermatitis 690.18     Psoriasis 696.1V     Snores 786.09CA     GERD (GASTROESOPHAGEAL REFLUX DISEASE) 530.81K     Seasonal affective disorder 296.99P     Wears glasses V49.89BE     Voice disorders 784.40H     Hearing loss 389.9AB     Tinnitus 388.30E     Memory loss 780.93     Arthritis of hand, left 716.94T     Shoulder pain, right 719.41AP     Family history of Parkinson's disease V17.2AR     Parkinson's disease 332.0AB             No Known Allergies   Past Surgical History          Past Surgical History   Procedure Date     Gi surgery 2005       esophageal repair - nissen ? laparoscopic for reflux 2005     Hernia repair 2003       double hernia repair     Ent surgery 25 yrs ago       thyroglossal cyst removed     Knee surgery 23 yrs ago       thryoglossal cyst in right knee removed             Past Medical History         Past Medical  "History   Diagnosis Date     Tremor 7/14/2011     Hypercholesterolemia 7/15/2011     Other seborrheic dermatitis 7/15/2011     Psoriasis 7/15/2011     Snores 7/15/2011     GERD (gastroesophageal reflux disease) 7/15/2011     Seasonal affective disorder 7/15/2011     Wears glasses 7/15/2011     Voice disorders 7/15/2011     Hearing loss 7/15/2011     Tinnitus 7/15/2011     Memory loss 7/15/2011     Arthritis of hand, left 7/15/2011     Shoulder pain, right 7/15/2011     Family history of Parkinson's disease 7/15/2011     Parkinson's disease 12/7/2011             Social History                History                 Social History       Marital Status:          Spouse Name: N/A         Number of Children: N/A       Years of Education: N/A                Occupational History         Not on file.                 Social History Main Topics     Smoking status: Former Smoker     Smokeless tobacco: Never Used     Comment: quit 1980         Alcohol Use: Yes         very littel     Drug Use: Not on file     Sexually Active: Not on file   Other Topics Concern        Not on file          Social History Narrative     No narrative on file               B/P: 130/89, T: Data Unavailable, P: 78, R: Data Unavailable 180 lbs 0 oz  Blood pressure 130/89, pulse 78, height 1.753 m (5' 9\"), weight 81.647 kg (180 lb)., Body mass index is 26.58 kg/(m^2).      History obtained from patient and family     Over 50% of this visit was spent in patient care and care coordination. Total visit times was 40 m inutes     Summary and Recommendations:      Parkinson's disease - has a variety of issues     Skin issues: discussed and provided Rx for hair products  Constipation: dietary changes and miralax and senokot options  MObility issues; would try meds - see below and get pt and ot   Trial of sinemet 25/100  1 tablet daily for 3-7 days then 1 tab twice daily for 7 days then consider increasing to 1 tab 3 times per day.     Speech therapy and " physical therapy. Will need referral from Dr. Lorin Chavez to ensure that he has coverage.   Absolutely would benefit from BIG and LOUD therapy that has been specifically developed for individuals with parkinson's disease. Providers are listed on the [employer] website and include people in  in [place]. Unclear if there is anyone certified in [place].      Return back in 6 months. May want to begin care at  as well.      For physical therapy  [email or web address]   Phone: [phone]   [facility]   [address] [place] [address] [place]   For speech therapy  Sarah [email or web address]   Phone: [phone]   [facility]   [address] [place] [address] [place]   Or      Lynn [email or web address]   Phone: [phone]   [facility]   [address] [place] [address] [place]     Arash Jung MD      __________________________________________________________      Cc; parkinson second opinion  60 year old man with left sided onse parkinson.     Had seen Dr. Kelly who diagnosed his parkinson's disease. The tremors are the most disabling problem. He had received a Rx for trihexyphenidyl and began 2mg tabs 1/2 tab daily then increased to 1/2 tab twice daily. He has been taking this since April 2011. There is a some slight benefit but not dramatic especially when doing a task the tremor comes out.      Wants to know what to do with the artane; should it be increased. In the past 2 or 3 months his thinking is not as clear. Has memory lapses. He is unsure if med related.     Agent orange question     Work related - disability issues     Physical therapy     DRIVING  TALKING        Cc: 60 year old male           14 Review of systems  are negative except for Patient Active Problem List   Diagnoses Code     Tremor 781.0AC     Hypercholesterolemia 272.0G     Other seborrheic dermatitis  690.18     Psoriasis 696.1V     Snores 786.09CA     GERD (GASTROESOPHAGEAL REFLUX DISEASE) 530.81K     Seasonal affective disorder 296.99P     Wears glasses V49.89BE     Voice disorders 784.40H     Hearing loss 389.9AB     Tinnitus 388.30E     Memory loss 780.93     Arthritis of hand, left 716.94T     Shoulder pain, right 719.41AP          No Known Allergies   Past Surgical History          Past Surgical History   Procedure Date     Gi surgery 2005       esophageal repair - nissen ? laparoscopic for reflux 2005     Hernia repair 2003       double hernia repair     Ent surgery 25 yrs ago       thyroglossal cyst removed     Knee surgery 23 yrs ago       thryoglossal cyst in right knee removed             Past Medical History         Past Medical History   Diagnosis Date     Tremor 7/14/2011     Hypercholesterolemia 7/15/2011     Other seborrheic dermatitis 7/15/2011     Psoriasis 7/15/2011     Snores 7/15/2011     GERD (GASTROESOPHAGEAL REFLUX DISEASE) 7/15/2011     Seasonal affective disorder 7/15/2011     Wears glasses 7/15/2011     Voice disorders 7/15/2011     Hearing loss 7/15/2011     Tinnitus 7/15/2011     Memory loss 7/15/2011     Arthritis of hand, left 7/15/2011     Shoulder pain, right 7/15/2011             Social History                History                 Social History       Marital Status:          Spouse Name: N/A         Number of Children: N/A       Years of Education: N/A                Occupational History         Not on file.                 Social History Main Topics     Smoking status: Former Smoker     Smokeless tobacco: Never Used     Comment: quit 1980         Alcohol Use: Yes         very littel     Drug Use: Not on file     Sexually Active: Not on file   Other Topics Concern        Not on file          Social History Narrative     No narrative on file             Family History           Family History   Problem Relation Age of Onset     Heart Mother       Heart Father        Cancer Sister         breast      Cancer Sister         breast      Psychiatry Brother         schizophrenia; committed suicide; had depression                Examination  B/P: 133/90, T: Data Unavailable, P: 68, R: Data Unavailable 174 lbs 0 oz  General examination: well developed, nourished and normal affect  Carotid: No bruits. Chest CTA, Heart regular without gallops or murmurs. Abdomen soft nontender, no masses, bowel sounds intact. Periphery: normal pulses without edema. No skin lesions. MENTAL STATUS:  Alert, oriented x3.  Speech fluent with normal naming, repetition, comprehension.  Good right-left orientation, Can remember 1/3 objects.  5/5 on spelling world backwards CRANIAL NERVES:  Disks flat. Pupils are equal, round, reactive to light.  Normal vascularity and fields. Extraocular movements full.  Facial sensation and movement normal.  Hearing intact. Palate moves symmetrically.  Tongue midline.  Sternocleidomastoid and trapezius strength intact.  Neck strength was normal.  NEUROLOGIC:  Tone: rigidity more on the left. Motor in upper and lower extremities. 5/5.  Reflexes 2/4.  Toe signs downgoing.  Good finger-nose-finger, fine finger movement, heel-shin maneuver, sensation to light touch, position sense and vibration and temperature was normal. Gait normal except for reduced left arm swing and slightly stooped. Romberg and postural stability intact. Tremor present on the left side.      Outside records reviewed and revealed - details related to medication adjustment and scan.  History obtained from patient and family     Summary and Recommendations:      Parkinson's disease: left side onset  Agent Orange exposure in [place].  Probable link between the two and therefore he merits from service related benefits for his parkinson's disease.      He has noticed memory problems that potentially could be related to his trihexyphendyl vs. His disease.  He          Plan:     Reduce the dose of trihexyphenidyl to  1/2 tab daily for 1-2 weeks   See if memory is better     Then stop it or overlap with starting on mirapex     Start on mirapex (pramipexole)  0.25mg  1/2 tab 3 times a day for one week  1 tab 3 times a day for one week  2 tabs 3 times a day     Other options would be to try   requip which is like mirapex  Or  Sinemet (carbidopa/levodopa)  Or   Azilect (rasagline)   Or   Selegiline (eldepryl)        [facility] at [facility]        CONSIDER NEUROPSYCHOLOGICAL EVALUATION WITH DR. HERNANDEZ AT [facility] TO ESTABLISH COGNITIVE BASELINE     followup will be determined by the patient - either here or at [facility] with Rebeka Hernandez. Jesica Gamble or Annabel Rowland                        Patient Instructions - Arash Jung MD - 04/01/2016 12:21 PM CDT  Thank you for coming in today. If you have any questions or concerns please feel free to call us at [phone]. You can also send us a message through [employer]. If you had a lab or test today and the results were not normal or we have a concern, we will contact you.    Diagnosis/Summary/Recommendations:    Parkinson - has had symptoms for 5 yrs or so. Seen 2011.   Left side Onset.   He has tremor this is affecting his quality of life.     He had benefit from ropinirole in reducing the tremor.     He appears to have off time but not clear if it is 1-2 hours.     Started the neupro 2mg patch 2 days ago  Stopped the ropinirole 2 days ago. It was 2 mg 5 times per day   Continues on the sinemet. 1 tablet 25/100: 4 times per day  lodosyn 25mg 1 tablet as needed    He has a Rx for atropine for drooling at night.     He is not taking midodrine - states it was not effective.   His blood pressure was 134/97 today.     He has had dizziness, which has been present for a long time  He states he has had light headedness.     He had seen Dr. Dumont 3 weeks ago.     He has had falls - getting in and out of the tub.  He has had Memory problems  Vision: Wears  glasses  Hearing aids. Has tinnitus.  Heart: denies  Has constipation and has GERD  Using miralax and stool softeners  Has a history of basal cell on his face and eczema  Snores - has not had a sleep study  Endo: on crestor for cholesterol. Does not remember to take it  Denies thyroid or diabetes  Blood: denies  Allergies: statins  Id: denies  Bladder control: good - has nocturia 1/noc  Voice problems - has not done voice therapy  Has not completed a course of therapy.  Wife is retired and is back home in [place].   He takes the van to the [facility].   He would have to get transport to the  - either by airport van or the [employer] bus.     PLAN:  neuropsych evaluation - planned for 4/11/2016  He is more Dizzy than before and may continue on the patch for a few more Days to see if this clears up. Then may want to go off the patch for a few days to see if dizziness is better. He may ultimately want to go back on the requip he was taking 2mg 4-5 times per day.   He is scheduled to see Dr. Dumont in 3months  He has not completed his dbs workup.  Given transport issues would be tough to enroll in a istradefylline trial that is ongoing at the     Return to see me or the NP/PA.           Arash Jung MD

## 2018-06-22 NOTE — Clinical Note
2018       RE: Silvino Bates  [address] [address]     Dear Colleague,    Thank you for referring your patient, Silvino Bates, to the [facility] NEUROLOGY at [facility]. Please see a copy of my visit note below.      Summary and Recommendations:         Arash Jung MD  _____________________________________________________________________  PATIENT: Silvino Bates  67 year old male   : 1950  ZACH: 2018    Consult requested by patient/other        Outside records reviewed and revealed  - inserted.       History obtained from patient      History of Present Illness  66 yo man with parkinson        Medications            Desonide desowen 0.05%        Hydrocortisone scalp relief anti-itch 1% soln        Ketoconazole nizoral 2% shampoo        Polyethylene glycol miralax        Pyrithione zin 2% shampoo        Rosuvastatin calcium crestor         Sennosides senokot 8.6mg                                                                                                                                                       14 Review of systems  are negative except for   Patient Active Problem List   Diagnosis     Tremor     Hypercholesterolemia     Other seborrheic dermatitis     Psoriasis     Snores     GERD (gastroesophageal reflux disease)     Seasonal affective disorder (H)     Wears glasses     Voice disorders     Hearing loss     Tinnitus     Memory loss     Arthritis of hand, left     Shoulder pain, right     Family history of Parkinson's disease     Parkinson's disease (H)     Basal cell carcinoma of skin        Allergies   Allergen Reactions     Pramipexole      Yellow Dye      Present in Sinemet     Past Surgical History:   Procedure Laterality Date     ENT SURGERY  25 yrs ago    thyroglossal cyst removed     GI SURGERY  2005    esophageal repair - nissen ? laparoscopic for reflux 2005     HERNIA REPAIR  2003    double hernia repair     KNEE  SURGERY  23 yrs ago    thryoglossal cyst in right knee removed     SHOULDER SURGERY  march 2012    right rotator cuff - outopatient in [place] [facility]     Past Medical History:   Diagnosis Date     Arthritis of hand, left 7/15/2011     Basal cell carcinoma of skin 6/14/2012     Family history of Parkinson's disease 7/15/2011     GERD (gastroesophageal reflux disease) 7/15/2011     Hearing loss 7/15/2011     Hypercholesterolemia 7/15/2011     Memory loss 7/15/2011     Other seborrheic dermatitis 7/15/2011     Parkinson's disease (H) 12/7/2011     Psoriasis 7/15/2011     Seasonal affective disorder (H) 7/15/2011     Shoulder pain, right 7/15/2011     Snores 7/15/2011     Tinnitus 7/15/2011     Tremor 7/14/2011     Voice disorders 7/15/2011     Wears glasses 7/15/2011     Social History     Social History     Marital status:      Spouse name: N/A     Number of children: N/A     Years of education: N/A     Occupational History     Not on file.     Social History Main Topics     Smoking status: Former Smoker     Smokeless tobacco: Never Used      Comment: quit 1980     Alcohol use Yes      Comment: very little     Drug use: Not on file     Sexual activity: Not on file     Other Topics Concern     Not on file     Social History Narrative    Wife is from [place] and had a 1/3 of her stomach removed and is going back for more tests    Living in [place]    Has 2 daughters - adult    This is second marriage.    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     He has problems carrying on converstation             Family History   Problem Relation Age of Onset     HEART DISEASE Mother      Parkinsonism Mother      MICROGRAPHIA     HEART DISEASE Father      Parkinsonism Father      TREMOR, REST AND ?     CANCER Sister      breast     CANCER Sister      breast     Psychotic Disorder  Brother      schizophrenia; committed suicide; had depression     Current Outpatient Prescriptions   Medication Sig Dispense Refill     desonide (DESOWEN) 0.05 % cream Apply 0.5 inches topically 2 times daily.       Hydrocortisone (SCALP RELIEF ANTI-ITCH) 1 % SOLN Externally apply  topically as needed.       ketoconazole (NIZORAL) 2 % shampoo Apply  topically daily as needed for itching. Apply to the affected area and wash off after 5 minutes 210 mL 1     polyethylene glycol (MIRALAX) powder Take 17 g by mouth daily as needed. 510 g 1     Pyrithione Zinc 2 % SHAM Externally apply  topically. Use as directed 1 Bottle 11     Rosuvastatin Calcium (CRESTOR PO) Take 1 tablet by mouth daily.       sennosides (SENOKOT) 8.6 MG tablet Take 1-2 tablets by mouth 2 times daily as needed for constipation. 120 tablet 11     Examination  B/P: Data Unavailable, T: Data Unavailable, P: Data Unavailable, R: Data Unavailable 0 lbs 0 oz  There were no vitals taken for this visit., There is no height or weight on file to calculate BMI.    Vitals signs were added and reviewed if not above. Please refer to the chart from this visit.    General examination: well developed, nourished and normal affect  Carotid: No bruits. Chest CTA, Heart regular without gallops or murmurs. Abdomen soft nontender, no masses, bowel sounds intact. Periphery: normal pulses without edema. No skin lesions. MENTAL STATUS:  Alert, oriented x3.  Speech fluent with normal naming, repetition, comprehension.  Good right-left orientation, Can remember ***/3 objects.   CRANIAL NERVES:  Disks flat. Pupils are equal, round, reactive to light.  Normal vascularity and fields. Extraocular movements full.  Facial sensation and movement normal.  Hearing intact. Palate moves symmetrically.  Tongue midline.  Sternocleidomastoid and trapezius strength intact.  Neck strength was normal.  NEUROLOGIC:  Tone: ***. Motor in upper and lower extremities. 5/5.  Reflexes 2/4.  Toe signs  downgoing.  Good finger-nose-finger, fine finger movement, heel-shin maneuver, sensation to light touch, position sense and vibration and temperature was normal. Gait normal except for ***. Romberg and postural stability *** Tremor ***      ______________________________________________________________________________________________      Return movement disorders     Saw dr. bañuelos in February and due to rash he was switched to ropinirole  Had rotator cuff shoulder in march and was recuperating. He was off ropinirole for a while and not sure if it is helping at all.      He has a bit of rash on his neck and the inside of both of his legs. It is visible after he gets out of the shower and is not uncomfortable on the legs. It is tender/sensitive but not itchy.      He has the rash that is present on the      Tremor is more on he left side: he is taking 1 mg 3 times per day.   He has some sleepiness from the requip. He can get hyper from the medication if the dose overlaps.     mirapex caused a rash as well as the sinemet which caused a welt on the neck. The present rash is not that uncomfortable.      Cc: 61 year old male   Issues discussed today        Patient was asked about 14 Review of systems including changes in vision (dry eyes, double vision), hearing, heart, lungs, musculoskeletal, depression, anxiety, snoring, RBD, insomnia, urinary frequency, urinary urgency, constipation, swallowing problems, hematological, ID, allergies, skin problems: seborrhea, endocrinological: thyroid, diabetes, cholesterol; balance, weight changes, and other neurological problems and these were not significant at this time except for       Patient Active Problem List   Diagnoses     Tremor     Hypercholesterolemia     Other seborrheic dermatitis     Psoriasis     Snores     GERD (GASTROESOPHAGEAL REFLUX DISEASE)     Seasonal affective disorder     Wears glasses     Voice disorders     Hearing loss     Tinnitus     Memory loss      Arthritis of hand, left     Shoulder pain, right     Family history of Parkinson's disease     Parkinson's disease     Basal cell carcinoma of skin                   Allergies   Allergen Reactions     Yellow Dye         Present in Sinemet       Past Surgical History          Past Surgical History   Procedure Date     Gi surgery 2005       esophageal repair - nissen ? laparoscopic for reflux 2005     Hernia repair 2003       double hernia repair     Ent surgery 25 yrs ago       thyroglossal cyst removed     Knee surgery 23 yrs ago       thryoglossal cyst in right knee removed     Shoulder surgery march 2012       right rotator cuff - outopatient in [facility]          Past Medical History         Past Medical History   Diagnosis Date     Tremor 7/14/2011     Hypercholesterolemia 7/15/2011     Other seborrheic dermatitis 7/15/2011     Psoriasis 7/15/2011     Snores 7/15/2011     GERD (gastroesophageal reflux disease) 7/15/2011     Seasonal affective disorder 7/15/2011     Wears glasses 7/15/2011     Voice disorders 7/15/2011     Hearing loss 7/15/2011     Tinnitus 7/15/2011     Memory loss 7/15/2011     Arthritis of hand, left 7/15/2011     Shoulder pain, right 7/15/2011     Family history of Parkinson's disease 7/15/2011     Parkinson's disease 12/7/2011     Basal cell carcinoma of skin 6/14/2012          Social History    History            Social History     Marital Status:        Spouse Name: N/A       Number of Children: N/A     Years of Education: N/A          Occupational History     Not on file.             Social History Main Topics     Smoking status: Former Smoker     Smokeless tobacco: Never Used     Comment: quit 1980       Alcohol Use: Yes         very little     Drug Use: Not on file     Sexually Active: Not on file           Other Topics Concern     Not on file          Social History Narrative     Wife is from [place] and had a 1/3 of her stomach removed and is going back for more  "tests[facility] 2 daughters - adultThis is second marriage.He is going to give up his job and has problems handling the programmingHe has a 100% disability for parkinson's disease from [facility] a some employer disability that he is seeking. He has problems carrying on converstation            B/P: 143/98, T: Data Unavailable, P: 68, R: Data Unavailable 178 lbs 0 oz  Blood pressure 143/98, pulse 68, height 1.753 m (5' 9\"), weight 80.74 kg (178 lb)., Body mass index is 26.29 kg/(m^2).      History obtained from patient     Over 50% of this visit was spent in patient care and care coordination. Total visit times was 25 minutes     Summary and Recommendations:      Parkinsons disease  Needs big and loud therapy at [facility] - [facility] was [facility] through [facility].   dr. bañuelos may have ordered this as well.     The reported allergic reactions should be reviewed again as it appeared to be focal - neck and may have affected his eyes.      He is taking only 1mg tid of requip which is probably not high enough to get benefit and presently only having some side effects. Need dr. bañuelos to review this medication and decide where to go.     Medication options:  Consider once daily requip xl to see if lessens the drowsiness  Consider trial of sinemet 10/100 or 25/250 blue coloration of tablets which does not have the yellow dye that may have caused his rash. The skin changes seems to be seborrhea, rosacea, eczema and psoriasis and not clear if it is a drug rash. The yellow sinemet may caused the other issues.   He has not been on amantadine or selegiline or rasasgiline as well  Filled out disability paperwork for his work.   He is presently disabled due to the cognitive slowness, bradyphrenia, tremor, slowness and stiffness,e tc.     This note will allow kee bañuelos to review his medications and help determine if it is worth trying the blue colored sinemet at some point -- 10/100 or 25/250 tablets " or trying the requip xl daily to address the sleepiness from the medication.     Return back in x months if desired by patient.               Arash Jung MD       Cc: 61 year old male   Issues discussed today     Would wake up with altered sensation in his hands/feet. He had these on the mirapex: 2 tabs 3 times per day. He is taking his medication every now and then. He noticed that a few days after discontinuing the medication his memory was better.     Vision: wears glasses. Went to  Eye doctor and given a new rx  Hearing: [facility] aids [facility]. Was in [employer]. Has 10% hearing loss and 30% parkinson's disease connection from the [facility].   Heart: fine blood pressure  Pulm: okay  MSK: right shoulder - rotator cuff tear and have these addressed in the spring 2012. Had left shoulder problem in the past that are better. Had a cyst in the right knee in the past.  Mood: has history of SAD. He does have a lot of desire  GI: come constipation. Not taking anything presently but is interested in ttaking something.  : nocturia 1-2 times per night. No prominent issues with bladder during day.  Derm: has some rx shampoo probably uses this daily when bad then t-gel. Uses desonide for facial flaking. Discussed several shampoo options. May use products every other day or every few days.   Has had some voice changes. Has not had voice therapy  Has problems sleeping due to imobility.  Mood issues are present  Gets chilled easily. Wears stocking cap.  Has some occasional drooling.  Has some fatigue  Has some stomache gas.  Has tremors on the left side.        Wife will be going to [place] this February. She is from there.              Patient was asked about 14 Review of systems including changes in vision (dry eyes, double vision), hearing, heart, lungs, musculoskeletal, depression, anxiety, snoring, RBD, insomnia, urinary frequency, urinary urgency, constipation, swallowing problems, hematological, ID, allergies,  skin problems: seborrhea, endocrinological: thyroid, diabetes, cholesterol; balance, weight changes, and other neurological problems and these were not significant at this time except for Patient Active Problem List   Diagnoses Code     Tremor 781.0AC     Hypercholesterolemia 272.0G     Other seborrheic dermatitis 690.18     Psoriasis 696.1V     Snores 786.09CA     GERD (GASTROESOPHAGEAL REFLUX DISEASE) 530.81K     Seasonal affective disorder 296.99P     Wears glasses V49.89BE     Voice disorders 784.40H     Hearing loss 389.9AB     Tinnitus 388.30E     Memory loss 780.93     Arthritis of hand, left 716.94T     Shoulder pain, right 719.41AP     Family history of Parkinson's disease V17.2AR     Parkinson's disease 332.0AB             No Known Allergies   Past Surgical History          Past Surgical History   Procedure Date     Gi surgery 2005       esophageal repair - nissen ? laparoscopic for reflux 2005     Hernia repair 2003       double hernia repair     Ent surgery 25 yrs ago       thyroglossal cyst removed     Knee surgery 23 yrs ago       thryoglossal cyst in right knee removed             Past Medical History         Past Medical History   Diagnosis Date     Tremor 7/14/2011     Hypercholesterolemia 7/15/2011     Other seborrheic dermatitis 7/15/2011     Psoriasis 7/15/2011     Snores 7/15/2011     GERD (gastroesophageal reflux disease) 7/15/2011     Seasonal affective disorder 7/15/2011     Wears glasses 7/15/2011     Voice disorders 7/15/2011     Hearing loss 7/15/2011     Tinnitus 7/15/2011     Memory loss 7/15/2011     Arthritis of hand, left 7/15/2011     Shoulder pain, right 7/15/2011     Family history of Parkinson's disease 7/15/2011     Parkinson's disease 12/7/2011             Social History                History                 Social History       Marital Status:          Spouse Name: N/A         Number of Children: N/A       Years of Education: N/A                Occupational History      "    Not on file.                 Social History Main Topics     Smoking status: Former Smoker     Smokeless tobacco: Never Used     Comment: quit 1980         Alcohol Use: Yes         very littel     Drug Use: Not on file     Sexually Active: Not on file   Other Topics Concern        Not on file          Social History Narrative     No narrative on file               B/P: 130/89, T: Data Unavailable, P: 78, R: Data Unavailable 180 lbs 0 oz  Blood pressure 130/89, pulse 78, height 1.753 m (5' 9\"), weight 81.647 kg (180 lb)., Body mass index is 26.58 kg/(m^2).      History obtained from patient and family     Over 50% of this visit was spent in patient care and care coordination. Total visit times was 40 m inutes     Summary and Recommendations:      Parkinson's disease - has a variety of issues     Skin issues: discussed and provided Rx for hair products  Constipation: dietary changes and miralax and senokot options  MObility issues; would try meds - see below and get pt and ot   Trial of sinemet 25/100  1 tablet daily for 3-7 days then 1 tab twice daily for 7 days then consider increasing to 1 tab 3 times per day.     Speech therapy and physical therapy. Will need referral from Dr. Lorin Chavez to ensure that he has coverage.   Absolutely would benefit from BIG and LOUD therapy that has been specifically developed for individuals with parkinson's disease. Providers are listed on the [email or web address] website and include people in [facility] in [place]. Unclear if there is anyone certified in [place].      Return back in 6 months. May want to begin care at [facility] as well.      For physical therapy  [email or web address]   Phone: [phone]   [facility]   [address] [place] [address] [place]   For speech therapy  Sarah [email or web address]   Phone: [phone]   [facility]   [address] [place] [address] [place]   Or      Lynn Glover "   [email or web address]   Phone: [phone]   [facility]   [address]   [place], [place] [address]     Arash Jung MD      __________________________________________________________      Cc; parkinson second opinion  60 year old man with left sided onse parkinson.     Had seen Dr. Kelly who diagnosed his parkinson's disease. The tremors are the most disabling problem. He had received a Rx for trihexyphenidyl and began 2mg tabs 1/2 tab daily then increased to 1/2 tab twice daily. He has been taking this since April 2011. There is a some slight benefit but not dramatic especially when doing a task the tremor comes out.      Wants to know what to do with the artane; should it be increased. In the past 2 or 3 months his thinking is not as clear. Has memory lapses. He is unsure if med related.     Agent orange question     Work related - disability issues     Physical therapy     DRIVING  TALKING        Cc: 60 year old male           14 Review of systems  are negative except for Patient Active Problem List   Diagnoses Code     Tremor 781.0AC     Hypercholesterolemia 272.0G     Other seborrheic dermatitis 690.18     Psoriasis 696.1V     Snores 786.09CA     GERD (GASTROESOPHAGEAL REFLUX DISEASE) 530.81K     Seasonal affective disorder 296.99P     Wears glasses V49.89BE     Voice disorders 784.40H     Hearing loss 389.9AB     Tinnitus 388.30E     Memory loss 780.93     Arthritis of hand, left 716.94T     Shoulder pain, right 719.41AP          No Known Allergies   Past Surgical History          Past Surgical History   Procedure Date     Gi surgery 2005       esophageal repair - nissen ? laparoscopic for reflux 2005     Hernia repair 2003       double hernia repair     Ent surgery 25 yrs ago       thyroglossal cyst removed     Knee surgery 23 yrs ago       thryoglossal cyst in right knee removed             Past Medical History         Past Medical History   Diagnosis Date      Tremor 7/14/2011     Hypercholesterolemia 7/15/2011     Other seborrheic dermatitis 7/15/2011     Psoriasis 7/15/2011     Snores 7/15/2011     GERD (GASTROESOPHAGEAL REFLUX DISEASE) 7/15/2011     Seasonal affective disorder 7/15/2011     Wears glasses 7/15/2011     Voice disorders 7/15/2011     Hearing loss 7/15/2011     Tinnitus 7/15/2011     Memory loss 7/15/2011     Arthritis of hand, left 7/15/2011     Shoulder pain, right 7/15/2011             Social History                History                 Social History       Marital Status:          Spouse Name: N/A         Number of Children: N/A       Years of Education: N/A                Occupational History         Not on file.                 Social History Main Topics     Smoking status: Former Smoker     Smokeless tobacco: Never Used     Comment: quit 1980         Alcohol Use: Yes         very littel     Drug Use: Not on file     Sexually Active: Not on file   Other Topics Concern        Not on file          Social History Narrative     No narrative on file             Family History           Family History   Problem Relation Age of Onset     Heart Mother       Heart Father       Cancer Sister         breast      Cancer Sister         breast      Psychiatry Brother         schizophrenia; committed suicide; had depression                Examination  B/P: 133/90, T: Data Unavailable, P: 68, R: Data Unavailable 174 lbs 0 oz  General examination: well developed, nourished and normal affect  Carotid: No bruits. Chest CTA, Heart regular without gallops or murmurs. Abdomen soft nontender, no masses, bowel sounds intact. Periphery: normal pulses without edema. No skin lesions. MENTAL STATUS:  Alert, oriented x3.  Speech fluent with normal naming, repetition, comprehension.  Good right-left orientation, Can remember 1/3 objects.  5/5 on spelling world backwards CRANIAL NERVES:  Disks flat. Pupils are equal, round, reactive to light.  Normal vascularity and  fields. Extraocular movements full.  Facial sensation and movement normal.  Hearing intact. Palate moves symmetrically.  Tongue midline.  Sternocleidomastoid and trapezius strength intact.  Neck strength was normal.  NEUROLOGIC:  Tone: rigidity more on the left. Motor in upper and lower extremities. 5/5.  Reflexes 2/4.  Toe signs downgoing.  Good finger-nose-finger, fine finger movement, heel-shin maneuver, sensation to light touch, position sense and vibration and temperature was normal. Gait normal except for reduced left arm swing and slightly stooped. Romberg and postural stability intact. Tremor present on the left side.      Outside records reviewed and revealed - details related to medication adjustment and scan.  History obtained from patient and family     Summary and Recommendations:      Parkinson's disease: left side onset  Agent Orange exposure in [place].  Probable link between the two and therefore he merits from service related benefits for his parkinson's disease.      He has noticed memory problems that potentially could be related to his trihexyphendyl vs. His disease.  He          Plan:     Reduce the dose of trihexyphenidyl to 1/2 tab daily for 1-2 weeks   See if memory is better     Then stop it or overlap with starting on mirapex     Start on mirapex (pramipexole)  0.25mg  1/2 tab 3 times a day for one week  1 tab 3 times a day for one week  2 tabs 3 times a day     Other options would be to try   requip which is like mirapex  Or  Sinemet (carbidopa/levodopa)  Or   Azilect (rasagline)   Or   Selegiline (eldepryl)        BIG AND LOUD PHYSICAL THERAPY at [facility]        CONSIDER NEUROPSYCHOLOGICAL EVALUATION WITH DR. DYKES AT [facility] TO ESTABLISH COGNITIVE BASELINE     followup will be determined by the patient - either here or at [facility] with Rebeka Dykes. Jesica Gamble or Annabel Rowland                        Again, thank you for allowing me to participate in the care of your  patient.      Sincerely,    Arash Jung MD

## 2018-06-22 NOTE — LETTER
6/22/2018       RE: Silvino Bates  [address]  [facility] [address]     Dear Colleague,    Thank you for referring your patient, Silvino Bates, to the [facility] NEUROLOGY at [facility]. Please see a copy of my visit note below.      Summary and Recommendations:     Parkinson disease with dementia - seen initially by me in 2011. Duration of PD is at least 7-8 yrs.   Both parents probably had parkinson  No one else in family has manifested with parkinson  There is a history of psychotic disorder in a brother who committed suicide and two sisters had breast cancer    Mobility wise he is fairly functional based on his gait and movement with his present sinemet medication regimen of 2 tabs 3/day    His cognition is affected with bradyphrenia - slowed responses and he also has memory impairment. He appears to have dementia and this may be parkinson disease with dementia (PDD) vs parkinson with alzheimer - mixed condition. PDD vs lewy body disease is a somewhat arbitrary separation and not necessarily that important in management    His behavior has improved from when he was living in [facility] ([facility]) and where he is now there are not these issues    His hallucinations persist but are not disabling and he is not on seroquel (quetiapine) or nuplazid (pimavanserin).  We discussed these medications briefly but will not prescribe either of them. Nonetheless both can affect the QTc interval so will get an ECG today for baseline QTc.    He had a spell of intermittent atrial fibrillation - and had monitoring in [facility] - they have not had a followup on this with cardiology at the [facility] or at the . Will get an ECG today.     Signed up his wife Jesica Bates for proxy access to his medical records. Silvino lives in [facility] and she lives in [facility] and visits twice monthly.     They wish to come back for  ongoing care.       Arash Jung MD  _____________________________________________________________________  PATIENT: Silvino Bates  67 year old male   : 1950  ZACH: 2018    Consult requested by patient/other  Outside records reviewed and revealed  - inserted.   History obtained from patient  History of Present Illness  68 yo man with parkinson  Previously seen by me in   Has seen by Julia  [facility]     Parkinson's disease - seen initially by jairo 7/15/2011 and had been diagnosed with PD by Dr. morrison in  or so.  He had tremor as the most disabling problem at the time he was initially seen    Since the initial evaluation and over the past year or two  He has had a fall out of bed and was scanned as he hit his head.   He was having lots of hallucinations -   He has some now  He is sleeping better with remeron  He has had diarrhea from c diff and is on medication for this.   As a result he is no longer on medications for constipation    Ros  Reading glasses  Hearing is not very good - will be seeing audiology coming up and it is time for new hearing aides.   Doing physical therapy  Voice is affected  States he may have done speech therapy in the past  He has had diarrhea from the c diff  He is no longer on crestor - for his cholesterol - he has a history of elevated cholesterol.   He has not been on this for a w hile  His weight has stabilized  He does not know his blood sugar or thyroid status.   Id - c diff. Had uti 2017  Allergies - list reviewed. Yellow dye ? Also had problems with atorvastatin and simvastinn  He is taking yellow sinemet tablets = probably does not have a problem with yellow.  He has had vitamin d deficiency   Lungs are fine  Blood pressure is okay today  He had been on medication in the past to RAISE his blood pressure but is no longer on this medication  He is not sure if he is fainting  He has had cognitive problems  He does  not know when he had an ECG in the past  When he was hospitalized for c diff - he did have a bout of afib in march 2018  This presumably has resolved and he is not on aspirin or coumadin  He had shoulder problems in the past. Right shoulder  Right knee cyst removed.   He has had left shoulder problems now.   He is sleeping better with remeron - he is snoring a bit.   Has a history of skin cancer - basal cell and eczema and seborrhea    Medications            Acetaminophen 325mg         Carbidopa/levodopa 25/100 2 2 2     Cyanocobalamin vitamin b12 100mcg         Desonide desowen 0.05%        Docusate sodium colace 100mg prn       Donepezil aricept 10mg or 5mg        Hydrocortisone scalp relief anti-itch 1% soln        Ketoconazole nizoral 2% cream        Ketoconazole nizoral 2% shampoo        Linaclotide linzess 145mcg capsule        Midodrine proamatine 2.5mg        Mirtazapine 30mg        Ondansetron zofran 4mg        Polyethylene glycol miralax        Pyrithione zin 2% shampoo        Rosuvastatin calcium crestor         Sennosides senokot 8.6mg         Vitamin D 5000 units                                                                                                                                          For physical therapy  [email or web address]   Phone: [phone]   [facility]   [address] [place] [address] [place]   For speech therapy  Sarah [email or web address]   Phone: [phone]   [facility]   [address] [place] [address] [place]   Or      [email or web address]   Phone: [phone]   [facility]   [address] [place] [address] [place]     14 Review of systems  are negative except for   Patient Active Problem List   Diagnosis     Tremor     Hypercholesterolemia     Other seborrheic dermatitis     Psoriasis     Snores     GERD (gastroesophageal reflux disease)     Seasonal  affective disorder (H)     Wears glasses     Voice disorders     Hearing loss     Tinnitus     Memory loss     Arthritis of hand, left     Shoulder pain, right     Family history of Parkinson's disease     Parkinson's disease (H)     Basal cell carcinoma of skin        Allergies   Allergen Reactions     Pramipexole      Yellow Dye      Present in Sinemet     Past Surgical History:   Procedure Laterality Date     ENT SURGERY  25 yrs ago    thyroglossal cyst removed     GI SURGERY  2005    esophageal repair - nissen ? laparoscopic for reflux 2005     HERNIA REPAIR  2003    double hernia repair     KNEE SURGERY  23 yrs ago    thryoglossal cyst in right knee removed     SHOULDER SURGERY  march 2012    right rotator cuff - outopatient in [place]     Past Medical History:   Diagnosis Date     Arthritis of hand, left 7/15/2011     Basal cell carcinoma of skin 6/14/2012     Family history of Parkinson's disease 7/15/2011     GERD (gastroesophageal reflux disease) 7/15/2011     Hearing loss 7/15/2011     Hypercholesterolemia 7/15/2011     Memory loss 7/15/2011     Other seborrheic dermatitis 7/15/2011     Parkinson's disease (H) 12/7/2011     Psoriasis 7/15/2011     Seasonal affective disorder (H) 7/15/2011     Shoulder pain, right 7/15/2011     Snores 7/15/2011     Tinnitus 7/15/2011     Tremor 7/14/2011     Voice disorders 7/15/2011     Wears glasses 7/15/2011     Social History     Social History     Marital status:      Spouse name: N/A     Number of children: N/A     Years of education: N/A     Occupational History     Not on file.     Social History Main Topics     Smoking status: Former Smoker     Smokeless tobacco: Never Used      Comment: quit 1980     Alcohol use Yes      Comment: very little     Drug use: Not on file     Sexual activity: Not on file     Other Topics Concern     Not on file     Social History Narrative    Wife is from [place] and had a 1/3 of her stomach removed and is going back  for more tests    Living in [place]    Has 2 daughters - adult    This is second marriage.    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     He has problems carrying on converstation             Family History   Problem Relation Age of Onset     HEART DISEASE Mother      Parkinsonism Mother      MICROGRAPHIA     HEART DISEASE Father      Parkinsonism Father      TREMOR, REST AND ?     CANCER Sister      breast     CANCER Sister      breast     Psychotic Disorder Brother      schizophrenia; committed suicide; had depression     Current Outpatient Prescriptions   Medication Sig Dispense Refill     desonide (DESOWEN) 0.05 % cream Apply 0.5 inches topically 2 times daily.       Hydrocortisone (SCALP RELIEF ANTI-ITCH) 1 % SOLN Externally apply  topically as needed.       ketoconazole (NIZORAL) 2 % shampoo Apply  topically daily as needed for itching. Apply to the affected area and wash off after 5 minutes 210 mL 1     polyethylene glycol (MIRALAX) powder Take 17 g by mouth daily as needed. 510 g 1     Pyrithione Zinc 2 % SHAM Externally apply  topically. Use as directed 1 Bottle 11     Rosuvastatin Calcium (CRESTOR PO) Take 1 tablet by mouth daily.       sennosides (SENOKOT) 8.6 MG tablet Take 1-2 tablets by mouth 2 times daily as needed for constipation. 120 tablet 11     Examination  B/P: Data Unavailable, T: Data Unavailable, P: Data Unavailable, R: Data Unavailable 0 lbs 0 oz  There were no vitals taken for this visit., There is no height or weight on file to calculate BMI.    Vitals signs were added and reviewed if not above. Please refer to the chart from this visit.    General examination: well developed, nourished and normal affect  Carotid: No bruits. Chest CTA, Heart regular without gallops or murmurs. Abdomen soft nontender, no masses, bowel sounds intact. Periphery:  normal pulses without edema. No skin lesions. MENTAL STATUS:  Alert, oriented to year 2018 but thinks it is October and it is Thursday - it is Friday June 22.   Speech fluent with normal naming, repetition, comprehension.  Good right-left orientation, Can remember 0/3 objects.  Can spell world forwards.  CRANIAL NERVES:  Disks flat. Pupils are equal, round, reactive to light.  Normal vascularity and fields. Extraocular movements full.  Facial sensation and movement normal.  Hearing intact. Palate moves symmetrically.  Tongue midline.  Sternocleidomastoid and trapezius strength intact.  Neck strength was normal.  NEUROLOGIC:  Tone: marked increased tone on the left moderate increase on the right. Motor in upper and lower extremities. 5/5.  Reflexes 0/4.  Toe signs downgoing.  Good finger-nose-finger, fine finger movement, heel-shin maneuver, sensation to light touch, position sense and vibration and temperature was normal. Gait normal except for stooped with reduced left>right arm swing. Romberg and postural stability  - impairment with multiple 44 step recovery on the pull test. Tremor  - resting tremor of the left hand. And resting tremor of the right foot.       ______________________________________________________________________________________________    From past evaluations.       Return movement disorders     Saw dr. bañuelos in February and due to rash he was switched to ropinirole  Had rotator cuff shoulder in march and was recuperating. He was off ropinirole for a while and not sure if it is helping at all.      He has a bit of rash on his neck and the inside of both of his legs. It is visible after he gets out of the shower and is not uncomfortable on the legs. It is tender/sensitive but not itchy.      He has the rash that is present on the      Tremor is more on he left side: he is taking 1 mg 3 times per day.   He has some sleepiness from the requip. He can get hyper from the medication if the dose  overlaps.     mirapex caused a rash as well as the sinemet which caused a welt on the neck. The present rash is not that uncomfortable.      Cc: 61 year old male   Issues discussed today        Patient was asked about 14 Review of systems including changes in vision (dry eyes, double vision), hearing, heart, lungs, musculoskeletal, depression, anxiety, snoring, RBD, insomnia, urinary frequency, urinary urgency, constipation, swallowing problems, hematological, ID, allergies, skin problems: seborrhea, endocrinological: thyroid, diabetes, cholesterol; balance, weight changes, and other neurological problems and these were not significant at this time except for       Patient Active Problem List   Diagnoses     Tremor     Hypercholesterolemia     Other seborrheic dermatitis     Psoriasis     Snores     GERD (GASTROESOPHAGEAL REFLUX DISEASE)     Seasonal affective disorder     Wears glasses     Voice disorders     Hearing loss     Tinnitus     Memory loss     Arthritis of hand, left     Shoulder pain, right     Family history of Parkinson's disease     Parkinson's disease     Basal cell carcinoma of skin                   Allergies   Allergen Reactions     Yellow Dye         Present in Sinemet       Past Surgical History          Past Surgical History   Procedure Date     Gi surgery 2005       esophageal repair - nissen ? laparoscopic for reflux 2005     Hernia repair 2003       double hernia repair     Ent surgery 25 yrs ago       thyroglossal cyst removed     Knee surgery 23 yrs ago       thryoglossal cyst in right knee removed     Shoulder surgery march 2012       right rotator cuff - [facility]          Past Medical History         Past Medical History   Diagnosis Date     Tremor 7/14/2011     Hypercholesterolemia 7/15/2011     Other seborrheic dermatitis 7/15/2011     Psoriasis 7/15/2011     Snores 7/15/2011     GERD (gastroesophageal reflux disease) 7/15/2011     Seasonal affective disorder  "7/15/2011     Wears glasses 7/15/2011     Voice disorders 7/15/2011     Hearing loss 7/15/2011     Tinnitus 7/15/2011     Memory loss 7/15/2011     Arthritis of hand, left 7/15/2011     Shoulder pain, right 7/15/2011     Family history of Parkinson's disease 7/15/2011     Parkinson's disease 12/7/2011     Basal cell carcinoma of skin 6/14/2012          Social History    History            Social History     Marital Status:        Spouse Name: N/A       Number of Children: N/A     Years of Education: N/A          Occupational History     Not on file.             Social History Main Topics     Smoking status: Former Smoker     Smokeless tobacco: Never Used     Comment: quit 1980       Alcohol Use: Yes         very little     Drug Use: Not on file     Sexually Active: Not on file           Other Topics Concern     Not on file          Social History Narrative     Wife is from [place] and had a 1/3 of her stomach removed and is going back for more tests[facility] 2 daughters - adultThis is second marriage.He is going to give up his job and has problems handling the programmingHe has a 100% disability for parkinson's disease from [facility] a some employer disability that he is seeking. He has problems carrying on converstation            B/P: 143/98, T: Data Unavailable, P: 68, R: Data Unavailable 178 lbs 0 oz  Blood pressure 143/98, pulse 68, height 1.753 m (5' 9\"), weight 80.74 kg (178 lb)., Body mass index is 26.29 kg/(m^2).      History obtained from patient     Over 50% of this visit was spent in patient care and care coordination. Total visit times was 25 minutes     Summary and Recommendations:      Parkinsons disease  Needs big and loud therapy at [facility] - this was ordered through the [facility].   dr. bañuelos may have ordered this as well.     The reported allergic reactions should be reviewed again as it appeared to be focal - neck and may have affected his eyes.      He " is taking only 1mg tid of requip which is probably not high enough to get benefit and presently only having some side effects. Need dr. bañuelos to review this medication and decide where to go.     Medication options:  Consider once daily requip xl to see if lessens the drowsiness  Consider trial of sinemet 10/100 or 25/250 blue coloration of tablets which does not have the yellow dye that may have caused his rash. The skin changes seems to be seborrhea, rosacea, eczema and psoriasis and not clear if it is a drug rash. The yellow sinemet may caused the other issues.   He has not been on amantadine or selegiline or rasasgiline as well  Filled out disability paperwork for his work.   He is presently disabled due to the cognitive slowness, bradyphrenia, tremor, slowness and stiffness,e tc.     This note will allow kee bañuelos to review his medications and help determine if it is worth trying the blue colored sinemet at some point -- 10/100 or 25/250 tablets or trying the requip xl daily to address the sleepiness from the medication.     Return back in x months if desired by patient.            Arash Jung MD       Cc: 61 year old male   Issues discussed today     Would wake up with altered sensation in his hands/feet. He had these on the mirapex: 2 tabs 3 times per day. He is taking his medication every now and then. He noticed that a few days after discontinuing the medication his memory was better.     Vision: wears glasses. Went to  Eye doctor and given a new rx  Hearing: [facility]. Was in the [employer]. Has 10% hearing loss and 30% parkinson's disease connection from the [facility].   Heart: fine blood pressure  Pulm: okay  MSK: right shoulder - rotator cuff tear and have these addressed in the spring 2012. Had left shoulder problem in the past that are better. Had a cyst in the right knee in the past.  Mood: has history of SAD. He does have a lot of desire  GI: come constipation. Not taking anything  presently but is interested in ttaking something.  : nocturia 1-2 times per night. No prominent issues with bladder during day.  Derm: has some rx shampoo probably uses this daily when bad then t-gel. Uses desonide for facial flaking. Discussed several shampoo options. May use products every other day or every few days.   Has had some voice changes. Has not had voice therapy  Has problems sleeping due to imobility.  Mood issues are present  Gets chilled easily. Wears stocking cap.  Has some occasional drooling.  Has some fatigue  Has some stomache gas.  Has tremors on the left side.        Wife will be going to [place] this February. She is from there.              Patient was asked about 14 Review of systems including changes in vision (dry eyes, double vision), hearing, heart, lungs, musculoskeletal, depression, anxiety, snoring, RBD, insomnia, urinary frequency, urinary urgency, constipation, swallowing problems, hematological, ID, allergies, skin problems: seborrhea, endocrinological: thyroid, diabetes, cholesterol; balance, weight changes, and other neurological problems and these were not significant at this time except for Patient Active Problem List   Diagnoses Code     Tremor 781.0AC     Hypercholesterolemia 272.0G     Other seborrheic dermatitis 690.18     Psoriasis 696.1V     Snores 786.09CA     GERD (GASTROESOPHAGEAL REFLUX DISEASE) 530.81K     Seasonal affective disorder 296.99P     Wears glasses V49.89BE     Voice disorders 784.40H     Hearing loss 389.9AB     Tinnitus 388.30E     Memory loss 780.93     Arthritis of hand, left 716.94T     Shoulder pain, right 719.41AP     Family history of Parkinson's disease V17.2AR     Parkinson's disease 332.0AB             No Known Allergies   Past Surgical History          Past Surgical History   Procedure Date     Gi surgery 2005       esophageal repair - nissen ? laparoscopic for reflux 2005     Hernia repair 2003       double hernia repair     Ent surgery  "25 yrs ago       thyroglossal cyst removed     Knee surgery 23 yrs ago       thryoglossal cyst in right knee removed             Past Medical History         Past Medical History   Diagnosis Date     Tremor 7/14/2011     Hypercholesterolemia 7/15/2011     Other seborrheic dermatitis 7/15/2011     Psoriasis 7/15/2011     Snores 7/15/2011     GERD (gastroesophageal reflux disease) 7/15/2011     Seasonal affective disorder 7/15/2011     Wears glasses 7/15/2011     Voice disorders 7/15/2011     Hearing loss 7/15/2011     Tinnitus 7/15/2011     Memory loss 7/15/2011     Arthritis of hand, left 7/15/2011     Shoulder pain, right 7/15/2011     Family history of Parkinson's disease 7/15/2011     Parkinson's disease 12/7/2011             Social History                History                 Social History       Marital Status:          Spouse Name: N/A         Number of Children: N/A       Years of Education: N/A                Occupational History         Not on file.                 Social History Main Topics     Smoking status: Former Smoker     Smokeless tobacco: Never Used     Comment: quit 1980         Alcohol Use: Yes         very littel     Drug Use: Not on file     Sexually Active: Not on file   Other Topics Concern        Not on file          Social History Narrative     No narrative on file               B/P: 130/89, T: Data Unavailable, P: 78, R: Data Unavailable 180 lbs 0 oz  Blood pressure 130/89, pulse 78, height 1.753 m (5' 9\"), weight 81.647 kg (180 lb)., Body mass index is 26.58 kg/(m^2).      History obtained from patient and family     Over 50% of this visit was spent in patient care and care coordination. Total visit times was 40 m inutes     Summary and Recommendations:      Parkinson's disease - has a variety of issues     Skin issues: discussed and provided Rx for hair products  Constipation: dietary changes and miralax and senokot options  MObility issues; would try meds - see below and get pt " and ot   Trial of sinemet 25/100  1 tablet daily for 3-7 days then 1 tab twice daily for 7 days then consider increasing to 1 tab 3 times per day.     Speech therapy and physical therapy. Will need referral from Dr. Lorin Chavez to ensure that he has coverage.   Absolutely would benefit from BIG and LOUD therapy that has been specifically developed for individuals with parkinson's disease. Providers are listed on the [employer] website and include people in [facility] in [place]. Unclear if there is anyone certified in [place].      Return back in 6 months. May want to begin care at [facility] as well.      For physical therapy  [email or web address]   Phone: [phone]   [facility]   [address] [place] [address]    For speech therapy  Sarah [email or web address]   Phone: [phone]   [facility]   [address] [place] [address] [place]   Or      [email or web address]   Phone: [phone]   [facility]   [address] [place] [address] [place]     Arash Jung MD      __________________________________________________________      Cc; parkinson second opinion  60 year old man with left sided onse parkinson.     Had seen Dr. Kelly who diagnosed his parkinson's disease. The tremors are the most disabling problem. He had received a Rx for trihexyphenidyl and began 2mg tabs 1/2 tab daily then increased to 1/2 tab twice daily. He has been taking this since April 2011. There is a some slight benefit but not dramatic especially when doing a task the tremor comes out.      Wants to know what to do with the artane; should it be increased. In the past 2 or 3 months his thinking is not as clear. Has memory lapses. He is unsure if med related.     Agent orange question     Work related - disability issues     Physical therapy     DRIVING  TALKING        Cc: 60 year old male           14  Review of systems  are negative except for Patient Active Problem List   Diagnoses Code     Tremor 781.0AC     Hypercholesterolemia 272.0G     Other seborrheic dermatitis 690.18     Psoriasis 696.1V     Snores 786.09CA     GERD (GASTROESOPHAGEAL REFLUX DISEASE) 530.81K     Seasonal affective disorder 296.99P     Wears glasses V49.89BE     Voice disorders 784.40H     Hearing loss 389.9AB     Tinnitus 388.30E     Memory loss 780.93     Arthritis of hand, left 716.94T     Shoulder pain, right 719.41AP          No Known Allergies   Past Surgical History          Past Surgical History   Procedure Date     Gi surgery 2005       esophageal repair - nissen ? laparoscopic for reflux 2005     Hernia repair 2003       double hernia repair     Ent surgery 25 yrs ago       thyroglossal cyst removed     Knee surgery 23 yrs ago       thryoglossal cyst in right knee removed             Past Medical History         Past Medical History   Diagnosis Date     Tremor 7/14/2011     Hypercholesterolemia 7/15/2011     Other seborrheic dermatitis 7/15/2011     Psoriasis 7/15/2011     Snores 7/15/2011     GERD (GASTROESOPHAGEAL REFLUX DISEASE) 7/15/2011     Seasonal affective disorder 7/15/2011     Wears glasses 7/15/2011     Voice disorders 7/15/2011     Hearing loss 7/15/2011     Tinnitus 7/15/2011     Memory loss 7/15/2011     Arthritis of hand, left 7/15/2011     Shoulder pain, right 7/15/2011             Social History                History                 Social History       Marital Status:          Spouse Name: N/A         Number of Children: N/A       Years of Education: N/A                Occupational History         Not on file.                 Social History Main Topics     Smoking status: Former Smoker     Smokeless tobacco: Never Used     Comment: quit 1980         Alcohol Use: Yes         very littel     Drug Use: Not on file     Sexually Active: Not on file   Other Topics Concern        Not on file          Social  History Narrative     No narrative on file             Family History           Family History   Problem Relation Age of Onset     Heart Mother       Heart Father       Cancer Sister         breast      Cancer Sister         breast      Psychiatry Brother         schizophrenia; committed suicide; had depression                Examination  B/P: 133/90, T: Data Unavailable, P: 68, R: Data Unavailable 174 lbs 0 oz  General examination: well developed, nourished and normal affect  Carotid: No bruits. Chest CTA, Heart regular without gallops or murmurs. Abdomen soft nontender, no masses, bowel sounds intact. Periphery: normal pulses without edema. No skin lesions. MENTAL STATUS:  Alert, oriented x3.  Speech fluent with normal naming, repetition, comprehension.  Good right-left orientation, Can remember 1/3 objects.  5/5 on spelling world backwards CRANIAL NERVES:  Disks flat. Pupils are equal, round, reactive to light.  Normal vascularity and fields. Extraocular movements full.  Facial sensation and movement normal.  Hearing intact. Palate moves symmetrically.  Tongue midline.  Sternocleidomastoid and trapezius strength intact.  Neck strength was normal.  NEUROLOGIC:  Tone: rigidity more on the left. Motor in upper and lower extremities. 5/5.  Reflexes 2/4.  Toe signs downgoing.  Good finger-nose-finger, fine finger movement, heel-shin maneuver, sensation to light touch, position sense and vibration and temperature was normal. Gait normal except for reduced left arm swing and slightly stooped. Romberg and postural stability intact. Tremor present on the left side.      Outside records reviewed and revealed - details related to medication adjustment and scan.  History obtained from patient and family     Summary and Recommendations:      Parkinson's disease: left side onset  Agent Orange exposure in [place].  Probable link between the two and therefore he merits from service related benefits for his parkinson's disease.       He has noticed memory problems that potentially could be related to his trihexyphendyl vs. His disease.  He          Plan:     Reduce the dose of trihexyphenidyl to 1/2 tab daily for 1-2 weeks   See if memory is better     Then stop it or overlap with starting on mirapex     Start on mirapex (pramipexole)  0.25mg  1/2 tab 3 times a day for one week  1 tab 3 times a day for one week  2 tabs 3 times a day     Other options would be to try   requip which is like mirapex  Or  Sinemet (carbidopa/levodopa)  Or   Azilect (rasagline)   Or   Selegiline (eldepryl)        [facility] at [facility]        CONSIDER NEUROPSYCHOLOGICAL EVALUATION WITH DR. HERNANDEZ AT [facility] TO ESTABLISH COGNITIVE BASELINE     followup will be determined by the patient - either here or at [facility] with Rebeka Hernandez. Jesica Gamble or Annabel Rowland                        Patient Instructions - Arash Jung MD - 04/01/2016 12:21 PM CDT  Thank you for coming in today. If you have any questions or concerns please feel free to call us at [phone]. You can also send us a message through [employer]. If you had a lab or test today and the results were not normal or we have a concern, we will contact you.    Diagnosis/Summary/Recommendations:    Parkinson - has had symptoms for 5 yrs or so. Seen 2011.   Left side Onset.   He has tremor this is affecting his quality of life.     He had benefit from ropinirole in reducing the tremor.     He appears to have off time but not clear if it is 1-2 hours.     Started the neupro 2mg patch 2 days ago  Stopped the ropinirole 2 days ago. It was 2 mg 5 times per day   Continues on the sinemet. 1 tablet 25/100: 4 times per day  lodosyn 25mg 1 tablet as needed    He has a Rx for atropine for drooling at night.     He is not taking midodrine - states it was not effective.   His blood pressure was 134/97 today.     He has had dizziness, which has been present for a long time  He states  he has had light headedness.     He had seen Dr. Dumont 3 weeks ago.     He has had falls - getting in and out of the tub.  He has had Memory problems  Vision: Wears glasses  Hearing aids. Has tinnitus.  Heart: denies  Has constipation and has GERD  Using miralax and stool softeners  Has a history of basal cell on his face and eczema  Snores - has not had a sleep study  Endo: on crestor for cholesterol. Does not remember to take it  Denies thyroid or diabetes  Blood: denies  Allergies: statins  Id: denies  Bladder control: good - has nocturia 1/noc  Voice problems - has not done voice therapy  Has not completed a course of therapy.  Wife is retired and is back home in [place].   He takes the van to the [facility].   He would have to get transport to the  - either by airport van or the [employer] bus.     PLAN:  neuropsych evaluation - planned for 4/11/2016  He is more Dizzy than before and may continue on the patch for a few more Days to see if this clears up. Then may want to go off the patch for a few days to see if dizziness is better. He may ultimately want to go back on the requip he was taking 2mg 4-5 times per day.   He is scheduled to see Dr. Dumont in 3months  He has not completed his dbs workup.  Given transport issues would be tough to enroll in a istradefylline trial that is ongoing at the     Return to see me or the NP/PA.         Answers for HPI/ROS submitted by the patient on 6/22/2018   General Symptoms: Yes  Skin Symptoms: Yes  HENT Symptoms: Yes  EYE SYMPTOMS: No  HEART SYMPTOMS: No  LUNG SYMPTOMS: Yes  INTESTINAL SYMPTOMS: Yes  URINARY SYMPTOMS: No  REPRODUCTIVE SYMPTOMS: No  SKELETAL SYMPTOMS: Yes  BLOOD SYMPTOMS: No  NERVOUS SYSTEM SYMPTOMS: Yes  MENTAL HEALTH SYMPTOMS: Yes  Fever: No  Loss of appetite: No  Weight loss: No  Weight gain: No  Fatigue: No  Night sweats: No  Chills: No  Increased stress: No  Excessive hunger: No  Excessive thirst: No  Feeling hot or cold when others  believe the temperature is normal: No  Loss of height: No  Post-operative complications: No  Surgical site pain: No  Hallucinations: Yes  Change in or Loss of Energy: No  Hyperactivity: No  Confusion: Yes  Changes in hair: No  Changes in moles/birth marks: No  Itching: No  Rashes: No  Changes in nails: No  Acne: No  Change in facial hair: No  Warts: No  Non-healing sores: No  Scarring: No  Flaking of skin: Yes  Color changes of hands/feet in cold : No  Sun sensitivity: No  Skin thickening: No  Ear pain: No  Ear discharge: No  Hearing loss: Yes  Tinnitus: No  Nosebleeds: No  Congestion: No  Sinus pain: No  Trouble swallowing: No   Voice hoarseness: No  Mouth sores: No  Sore throat: No  Tooth pain: No  Gum tenderness: No  Bleeding gums: No  Change in taste: No  Change in sense of smell: No  Dry mouth: No  Hearing aid used: Yes  Neck lump: No  Cough: No  Sputum or phlegm: No  Coughing up blood: No  Difficulty breating or shortness of breath: No  Snoring: Yes  Wheezing: No  Difficulty breathing on exertion: No  Difficulty breathing when lying flat: No  Heart burn or indigestion: No  Nausea: Yes  Vomiting: No  Abdominal pain: Yes  Bloating: Yes  Constipation: Yes  Diarrhea: No  Blood in stool: No  Black stools: No  Rectal or Anal pain: No  Fecal incontinence: No  Yellowing of skin or eyes: No  Vomit with blood: No  Change in stools: No  Back pain: No  Muscle aches: No  Neck pain: No  Swollen joints: No  Joint pain: Yes  Bone pain: No  Muscle cramps: Yes  Muscle weakness: Yes  Joint stiffness: Yes  Bone fracture: No  Trouble with coordination: Yes  Dizziness or trouble with balance: Yes  Fainting or black-out spells: No  Memory loss: Yes  Headache: No  Seizures: No  Speech problems: Yes  Tingling: Yes  Tremor: Yes  Weakness: Yes  Difficulty walking: Yes  Paralysis: No  Numbness: Yes  Nervous or Anxious: Yes  Depression: Yes  Trouble sleeping: Yes  Trouble thinking or concentrating: Yes  Mood changes: Yes  Panic attacks:  Yes      Again, thank you for allowing me to participate in the care of your patient.      Sincerely,    Arash Jung MD

## 2018-06-22 NOTE — PATIENT INSTRUCTIONS
At your visit today, we discussed your risk for falls and preventive options.    Fall Prevention  Falls often occur due to slipping, tripping or losing your balance. Millions of people fall every year and injure themselves. Here are ways to reduce your risk of falling again.    Think about your fall, was there anything that caused your fall that can be fixed, removed, or replaced?    Make your home safe by keeping walkways clear of objects you may trip over.    Use non-slip pads under rugs. Do not use area rugs or small throw rugs.    Use non-slip mats in bathtubs and showers.    Install handrails and lights on staircases.    Do not walk in poorly lit areas.    Do not stand on chairs or wobbly ladders.    Use caution when reaching overhead or looking upward. This position can cause a loss of balance.    Be sure your shoes fit properly, have non-slip bottoms and are in good condition.     Wear shoes both inside and out. Avoid going barefoot or wearing slippers.    Be cautious when going up and down stairs, curbs, and when walking on uneven sidewalks.    If your balance is poor, consider using a cane or walker.    If your fall was related to alcohol use, stop or limit alcohol intake.     If your fall was related to use of sleeping medicines, talk to your doctor about this. You may need to reduce your dosage at bedtime if you awaken during the night to go to the bathroom.      To reduce the need for nighttime bathroom trips:  ? Avoid drinking fluids for several hours before going to bed  ? Empty your bladder before going to bed  ? Men can keep a urinal at the bedside    Stay as active as you can. Balance, flexibility, strength, and endurance all come from exercise. They all play a role in preventing falls. Ask your healthcare provider which types of activity are right for you.    Get your vision checked on a regular basis.    If you have pets, know where they are before you stand up or walk so you don't trip over  them.    Use night lights.  Date Last Reviewed: 11/5/2015 2000-2017 [employer]. [address] [place] [address], [place] [address]. All rights reserved. This information is not intended as a substitute for professional medical care. Always follow your healthcare professional's instructions.        Parkinson disease with dementia - seen initially by me in 2011. Duration of PD is at least 7-8 yrs.   Both parents probably had parkinson  No one else in family has manifested with parkinson  There is a history of psychotic disorder in a brother who committed suicide and two sisters had breast cancer    Mobility wise he is fairly functional based on his gait and movement with his present sinemet medication regimen of 2 tabs 3/day    His cognition is affected with bradyphrenia - slowed responses and he also has memory impairment. He appears to have dementia and this may be parkinson disease with dementia (PDD) vs parkinson with alzheimer - mixed condition. PDD vs lewy body disease is a somewhat arbitrary separation and not necessarily that important in management    His behavior has improved from when he was living in [facility] ([facility]) and where he is now there are not these issues    His hallucinations persist but are not disabling and he is not on seroquel (quetiapine) or nuplazid (pimavanserin).  We discussed these medications briefly but will not prescribe either of them. Nonetheless both can affect the QTc interval so will get an ECG today for baseline QTc.    He had a spell of intermittent atrial fibrillation - and [place] - they have not had a followup on this with cardiology at the [facility] or at the . Will get an ECG today.     Signed up his wife Jesica Bates for proxy access to his medical records. Silvino lives in [facility] and she lives in [facility] and visits twice monthly.     They wish to come back for ongoing care.

## 2018-06-22 NOTE — MR AVS SNAPSHOT
After Visit Summary   6/22/2018    Silvino Bates    MRN: [id number]           Patient Information     Date Of Birth          1950        Visit Information        Provider Department      6/22/2018 1:45 PM Arash Jung MD [facility] Neurology        Today's Diagnoses     Parkinsonism, unspecified Parkinsonism type (H)    -  1    Intermittent atrial fibrillation (H)        Seborrheic dermatitis        Change in mole          Care Instructions      At your visit today, we discussed your risk for falls and preventive options.    Fall Prevention  Falls often occur due to slipping, tripping or losing your balance. Millions of people fall every year and injure themselves. Here are ways to reduce your risk of falling again.    Think about your fall, was there anything that caused your fall that can be fixed, removed, or replaced?    Make your home safe by keeping walkways clear of objects you may trip over.    Use non-slip pads under rugs. Do not use area rugs or small throw rugs.    Use non-slip mats in bathtubs and showers.    Install handrails and lights on staircases.    Do not walk in poorly lit areas.    Do not stand on chairs or wobbly ladders.    Use caution when reaching overhead or looking upward. This position can cause a loss of balance.    Be sure your shoes fit properly, have non-slip bottoms and are in good condition.     Wear shoes both inside and out. Avoid going barefoot or wearing slippers.    Be cautious when going up and down stairs, curbs, and when walking on uneven sidewalks.    If your balance is poor, consider using a cane or walker.    If your fall was related to alcohol use, stop or limit alcohol intake.     If your fall was related to use of sleeping medicines, talk to your doctor about this. You may need to reduce your dosage at bedtime if you awaken during the night to go to the bathroom.      To reduce the need for nighttime bathroom trips:  ? Avoid drinking fluids  for several hours before going to bed  ? Empty your bladder before going to bed  ? Men can keep a urinal at the bedside    Stay as active as you can. Balance, flexibility, strength, and endurance all come from exercise. They all play a role in preventing falls. Ask your healthcare provider which types of activity are right for you.    Get your vision checked on a regular basis.    If you have pets, know where they are before you stand up or walk so you don't trip over them.    Use night lights.  Date Last Reviewed: 11/5/2015 2000-2017 [employer]. [address] [place] [address]. All rights reserved. This information is not intended as a substitute for professional medical care. Always follow your healthcare professional's instructions.        Parkinson disease with dementia - seen initially by me in 2011. Duration of PD is at least 7-8 yrs.   Both parents probably had parkinson  No one else in family has manifested with parkinson  There is a history of psychotic disorder in a brother who committed suicide and two sisters had breast cancer    Mobility wise he is fairly functional based on his gait and movement with his present sinemet medication regimen of 2 tabs 3/day    His cognition is affected with bradyphrenia - slowed responses and he also has memory impairment. He appears to have dementia and this may be parkinson disease with dementia (PDD) vs parkinson with alzheimer - mixed condition. PDD vs lewy body disease is a somewhat arbitrary separation and not necessarily that important in management    His behavior has improved from when he was living in [facility] [place] [facility] and where he is now there are not these issues    His hallucinations persist but are not disabling and he is not on seroquel (quetiapine) or nuplazid (pimavanserin).  We discussed these medications briefly but will not prescribe either of them. Nonetheless both can affect the QTc interval so  will get an ECG today for baseline QTc.    He had a spell of intermittent atrial fibrillation - and had monitoring in [place] - [facility] - they have not had a followup on this with cardiology at the [facility] or at the . Will get an ECG today.     Signed up his wife Jesica Bates for proxy access to his medical records. Silvino lives in [facility] and she lives in [facility] and visits twice monthly.     They wish to come back for ongoing care.             Follow-ups after your visit        Additional Services     CARDIOLOGY EVAL ADULT REFERRAL       Preferred location:  cardiology  Please be aware that coverage of these services is subject to the terms and limitations of your health insurance plan.  Call member services at your health plan with any benefit or coverage questions.      Please bring the following to your appointment:  Any x-rays, CTs or MRIs which have been performed. Contact the facility where they were done to arrange for  prior to your scheduled appointment.    List of current medications  This referral request   Any documents/labs given to you for this referral                  Follow-up notes from your care team     Return in about 3 months (around 9/22/2018).      Your next 10 appointments already scheduled     Jun 22, 2018  3:00 PM CDT   ecg with  CV EKG   [facility] ([facility])    [address]  1st Floor  [place] [address] [phone]            Sep 25, 2018 10:40 AM CDT   (Arrive by 10:25 AM)   Return Movement Disorder with Arash Jung MD   [facility] ([facility])    [address]  3rd Floor  [place] [address] [phone]              Who to contact     Please call your clinic at [phone] to:    Ask questions about your health    Make or cancel appointments    Discuss your medicines    Learn about your test results    Speak to your doctor             "Additional Information About Your Visit        Q2ebankingt Information     [employer] is an electronic gateway that provides easy, online access to your medical records. With [employer], you can request a clinic appointment, read your test results, renew a prescription or communicate with your care team.     To sign up for [employer] visit the website at [email or web address]   You will be asked to enter the access code listed below, as well as some personal information. Please follow the directions to create your username and password.     Your access code is: [email or web address]  Expires: 2018  2:26 PM     Your access code will  in 90 days. If you need help or a new code, please contact your [facility] or call [phone] for assistance.        Care EveryWhere ID     This is your Care EveryWhere ID. This could be used by other organizations to access your [facility] medical records  [id number]        Your Vitals Were     Pulse Height BMI (Body Mass Index)             92 1.753 m (5' 9\") 22.3 kg/m2          Blood Pressure from Last 3 Encounters:   18 131/75   12 143/98   11 130/89    Weight from Last 3 Encounters:   18 68.5 kg (151 lb)   12 80.7 kg (178 lb)   11 81.6 kg (180 lb)              We Performed the Following     CARDIOLOGY EVAL ADULT REFERRAL          Today's Medication Changes          These changes are accurate as of 18  2:40 PM.  If you have any questions, ask your nurse or doctor.               Start taking these medicines.        Dose/Directions    Cyanocobalamin 100 MCG Lozg   Started by:  Arash Jung MD        100mcg by mouth daily   Refills:  0         These medicines have changed or have updated prescriptions.        Dose/Directions    carbidopa-levodopa  MG per tablet   Commonly known as:  SINEMET   This may have changed:  additional instructions   Changed by:  Arash Jung MD        2 tabs by mouth 3/day "   Quantity:  90 tablet   Refills:  0       * cholecalciferol 21736 units capsule   Commonly known as:  VITAMIN D3   This may have changed:  Another medication with the same name was removed. Continue taking this medication, and follow the directions you see here.   Changed by:  Arash Jung MD        50,000 units by mouth on the 5th of every month   Refills:  0       * cholecalciferol 64521 units capsule   Commonly known as:  VITAMIN D3   This may have changed:  Another medication with the same name was removed. Continue taking this medication, and follow the directions you see here.   Changed by:  Arash Jung MD        50,000 units by mouth daily from 6/11 till 8/6/2018   Refills:  0       donepezil 10 MG tablet   Commonly known as:  ARICEPT   This may have changed:  additional instructions   Changed by:  Arash Jung MD        10mg by mouth once daily   Quantity:  30 tablet   Refills:  0       mirtazapine 30 MG tablet   Commonly known as:  REMERON   This may have changed:    - medication strength  - how much to take  - how to take this  - when to take this  - additional instructions   Changed by:  Arash Jung MD        30mg by mouth at bedtime   Quantity:  30 tablet   Refills:  0       TYLENOL 325 MG tablet   This may have changed:    - medication strength  - how much to take  - how to take this  - when to take this  - additional instructions   Generic drug:  acetaminophen   Changed by:  Arash Jung MD        2 x 325mg tabs by mouth twice daily - morning and evening   Quantity:  100 tablet   Refills:  0       * Notice:  This list has 2 medication(s) that are the same as other medications prescribed for you. Read the directions carefully, and ask your doctor or other care provider to review them with you.      Stop taking these medicines if you haven't already. Please contact your care team if you have questions.     CRESTOR PO   Stopped by:  Arash Jung MD            docusate sodium 100 MG capsule   Commonly known as:  COLACE   Stopped by:  Arash Jung MD           linaclotide 145 MCG capsule   Commonly known as:  LINZESS   Stopped by:  Arash Jung MD           midodrine 2.5 MG tablet   Commonly known as:  PROAMATINE   Stopped by:  Arash Jung MD           ondansetron 4 MG tablet   Commonly known as:  ZOFRAN   Stopped by:  Arash Jung MD           polyethylene glycol Packet   Commonly known as:  MIRALAX/GLYCOLAX   Stopped by:  Arash Jung MD           polyethylene glycol powder   Commonly known as:  MIRALAX   Stopped by:  Arash Jung MD           Pyrithione Zinc 2 % Sham   Stopped by:  Arash Jung MD           sennosides 8.6 MG tablet   Commonly known as:  SENOKOT   Stopped by:  Arash Jung MD                    Primary Care Provider Office Phone # Fax #    [facility] [phone]       [place] [id number]        Equal Access to Services     [facility]: Hadii aad ku hadasho Soomaali, waaxda luqadaha, qaybta kaalmada adeegyada, waxay idiin hayaan adeeg kharash la'woodn . So [facility] [phone].    ATENCIÓN: Si habla español, tiene a gallagher disposición servicios gratuitos de asistencia lingüística. Llame al [phone].    We comply with applicable federal civil rights laws and [place] laws. We do not discriminate on the basis of race, color, national origin, age, disability, sex, sexual orientation, or gender identity.            Thank you!     Thank you for choosing [facility] NEUROLOG[facility]  for your care. Our goal is always to provide you with excellent care. Hearing back from our patients is one way we can continue to improve our services. Please take a few minutes to complete the written survey that you may receive in the mail after your visit with us. Thank you!             Your Updated Medication List - Protect others around you: Learn how to safely use, store and  throw away your medicines at www.disposemymeds.org.          This list is accurate as of 6/22/18  2:40 PM.  Always use your most recent med list.                   Brand Name Dispense Instructions for use Diagnosis    carbidopa-levodopa  MG per tablet    SINEMET    90 tablet    2 tabs by mouth 3/day        Carboxymethylcellulose Sod PF 0.5 % Soln ophthalmic solution    REFRESH PLUS    1 Bottle    1 drop in both eyes every 6 hours as needed        * cholecalciferol 60409 units capsule    VITAMIN D3     50,000 units by mouth on the 5th of every month        * cholecalciferol 79709 units capsule    VITAMIN D3     50,000 units by mouth daily from 6/11 till 8/6/2018        Cyanocobalamin 100 MCG Lozg      100mcg by mouth daily        desonide 0.05 % cream    DESOWEN     Apply 0.5 inches topically 2 times daily.        donepezil 10 MG tablet    ARICEPT    30 tablet    10mg by mouth once daily        * ketoconazole 2 % cream    NIZORAL     Apply  topically two times a day as needed (To seborrheic dermatitis of the scalp and nose area for maintenance.).        * ketoconazole 2 % shampoo    NIZORAL    210 mL    Apply  topically daily as needed for itching. Apply to the affected area and wash off after 5 minutes    Paralysis agitans (H)       mirtazapine 30 MG tablet    REMERON    30 tablet    30mg by mouth at bedtime        nystatin ointment    MYCOSTATIN     Apply to scrotum every 8 hours as needed        SCALP RELIEF ANTI-ITCH 1 % Soln   Generic drug:  hydrocortisone      Externally apply  topically as needed.        triamcinolone 0.5 % cream    KENALOG     Apply to face twice daily as needed        TYLENOL 325 MG tablet   Generic drug:  acetaminophen     100 tablet    2 x 325mg tabs by mouth twice daily - morning and evening        Vancomycin HCl in Dextrose 750-5 MG/150ML-% Soln      50mg/ml - 125mg by mouth every other day for c diff        * Notice:  This list has 4 medication(s) that are the same as other  medications prescribed for you. Read the directions carefully, and ask your doctor or other care provider to review them with you.

## 2018-06-22 NOTE — Clinical Note
"[facility]  [facility] NEUROLOGY  [address] [address] [phone]            2018          Dear Keagan Proxy Patient,    We received a request to activate you as a proxy for another patient of [facility] or [facility].  In order to do so, we need to activate your [employer] account as well.    Your access code is: [unfilled]      Please access the [employer] website:  -  [employer] [email or web address]  -  [email or web address].    Below the ID and password fields, select the \"Sign Up Now\" as New User.  You will be prompted to enter the access code listed above as well as additional personal information.  Please follow the directions carefully when creating your username and password.    Once your account is activated, you can access the proxy accounts under \"Shared Medical Records\".    If you allow your access code to , or if you have any questions please call a [employer] Representative during normal clinic hours.     Sincerely,        [employer] Customer Service    "

## 2018-06-25 LAB — INTERPRETATION ECG - MUSE: NORMAL

## 2018-10-30 ENCOUNTER — RECORDS - HEALTHEAST (OUTPATIENT)
Dept: LAB | Facility: CLINIC | Age: 68
End: 2018-10-30

## 2018-10-30 LAB
ANION GAP SERPL CALCULATED.3IONS-SCNC: 9 MMOL/L (ref 5–18)
BUN SERPL-MCNC: 19 MG/DL (ref 8–28)
CALCIUM SERPL-MCNC: 9.6 MG/DL (ref 8.5–10.5)
CHLORIDE BLD-SCNC: 107 MMOL/L (ref 98–107)
CO2 SERPL-SCNC: 27 MMOL/L (ref 22–31)
CREAT SERPL-MCNC: 0.85 MG/DL (ref 0.7–1.3)
GFR SERPL CREATININE-BSD FRML MDRD: >60 ML/MIN/1.73M2
GLUCOSE BLD-MCNC: 77 MG/DL (ref 70–125)
MAGNESIUM SERPL-MCNC: 2.2 MG/DL (ref 1.8–2.6)
POTASSIUM BLD-SCNC: 3.7 MMOL/L (ref 3.5–5)
SODIUM SERPL-SCNC: 143 MMOL/L (ref 136–145)

## 2018-11-09 NOTE — PROGRESS NOTES
Diagnosis/Summary/Recommendations:    PATIENT: Silvino Bates  68 year old male     : 1950    ZACH: 2018    Parkinsonism    Going to a fitness gym 3 times per week  This is in [facility]      Wife is up for 10 days from [facility]     bp 111/60    Stomach usually never bothers him. He had constipation and had diarrhea before that.   He wonders if it is was a virus - something going around.     He is on sinemet. 7/8am, 1pm and 7/8pm  He sees Dr. Nash at the [facility]  He has seen Dr. Dumont in the past.     He is seeing shadows  Light shadows and dark shadows  Nothing definitive or well formed.   He is not seeing beings  Not sleeping very well.   He has problems falling and staying asleep.   He denies significant bladder problems  He has the gi problems as noted   He is not having heart burn  He may have a fib in the past.   He has not seen a cardiologist lately  He is not fainting.   He has seborrhea and may or may not be using product for this.   Eyes - has had some problems.  Hearing aides in place  No new skin cancer - had two premelanomas removed about one year ago.   Memory   Mood - denies depression  Has some anxiety.   Breathing is okay  Infections - none  No new allergies.   He has not had falls.   He denies freezing.   He does not have significant wearing off  He is taking medication for his cholesterol   His dizziness - not worse if he moves his head back and forth         Medications                      Acetaminophen 325mg    as needed               Carbidopa/levodopa 25/100 2 2 2         carboxymethylceullose sod fp refresh plus 0.5% soln opthalmic solution ? using       Cholecalciferol vitamin D3 50,000 units 5th day of the month            Cyanocobalamin vitamin b12 100mcg    1               Docusate sodium colace 100mg ? taking               Donepezil aricept 10mg or 5mg   1               Hydrocortisone scalp relief anti-itch 1% soln   ? taking               Ketoconazole  nizoral 2% cream   prn               Ketoconazole nizoral 2% shampoo   prn               Linaclotide linzess 145mcg capsule   not taking               Midodrine proamatine 2.5mg   not taking               Mirtazapine 30mg remeron        1         Nystatin mycostatin ointment ? taking            Ondansetron zofran 4mg  ? taking              Polyethylene glycol miralax ? taking                Vitamin D 45228 units See above                                                                                                   History obtained from patient     PLAN  Big and loud therapy through the [facility].  He is on medication THAT WILL Need to be sorted out.   Firstly he has been on seroquel for about one month and not clear if this is controlling hallucinations and in particular the nature of the hallucinations. He is taking 1/2 of 25mg twice daily and wondering if he needs more at night than the morning.   Additionally today his blood pressure was on the lower side and presumably he is off midodrine/proamatine and not clear if all his blood pressure are on the lower side. If so may want to resume the midodrine/proamatine. His parkinson and the seroquel and sinemet can all lower blood pressure.   Lastly he has been taking aricept and maybe this can be administered at night rather than the morning.   Will have Marie Rose, Pharm D talk with the facility and will need to see if the above issues can be addressed or at least get a report from the facility to help a decision to be make in regards to:  1. Blood pressure and need for midodrine/proamatine  2. Hallucination control with seroquel  3. Anxiety  4. He would benefit from big and loud therapy     Return visit to see Daina in 2-3 months.     Wife will be moving to [facility] where her daughter will be living and is 4 hours away from the [facility] facility.       Coding statement:   Duration of  Services: patient care and care coordination was 25 minutes  Greater  than 50% of this visit was spent in counseling and coordination of care.     Arash Jung MD     ______________________________________    Last visit date and details:      NO show     September 25, 2018     Parkinsonism     For physical therapy  [email or web address]   Phone: [phone]   [facility]   [address] [place] [address] [place]   For speech therapy  Sarah [email or web address]   Phone: [phone]   [facility]   [address] [place] [address] [place]   Or       [email or web address]   Phone: [phone]   [facility]   [address] [place] [address] [place]      Medications                      Acetaminophen 325mg                   Carbidopa/levodopa 25/100 2 2 2         Cholecalciferol vitamin D3 50,000 units             Cyanocobalamin vitamin b12 100mcg                   Desonide desowen 0.05%                  Docusate sodium colace 100mg prn               Donepezil aricept 10mg or 5mg                  Hydrocortisone scalp relief anti-itch 1% soln                  Ketoconazole nizoral 2% cream                  Ketoconazole nizoral 2% shampoo                  Linaclotide linzess 145mcg capsule                  Midodrine proamatine 2.5mg                  Mirtazapine 30mg remeron                 Nystatin mycostatin ointment             Ondansetron zofran 4mg  ? taking              Polyethylene glycol miralax ? taking                Pyrithione zin 2% shampoo                  Rosuvastatin calcium crestor                   Sennosides senokot 8.6mg                   Triamcinolone kenalog 0.5% cream             Vancomycin 750-5mg/150 ml % soln             Vitamin D 5000 units See above                                                                                                            History obtained from patient        Coding statement:   Duration of  Services: patient care and  care coordination was 0 minutes  Greater than 50% of this visit was spent in counseling and coordination of care.      Arash Jung MD      ______________________________________     Last visit date and details:       Parkinson disease with dementia - seen initially by me in 2011. Duration of PD is at least 7-8 yrs.   Both parents probably had parkinson  No one else in family has manifested with parkinson  There is a history of psychotic disorder in a brother who committed suicide and two sisters had breast cancer      Mobility wise he is fairly functional based on his gait and movement with his present sinemet medication regimen of 2 tabs 3/day      His cognition is affected with bradyphrenia - slowed responses and he also has memory impairment. He appears to have dementia and this may be parkinson disease with dementia (PDD) vs parkinson with alzheimer - mixed condition. PDD vs lewy body disease is a somewhat arbitrary separation and not necessarily that important in management      His behavior has improved from when he was living in [facility] and where he is now there are not these issues      His hallucinations persist but are not disabling and he is not on seroquel (quetiapine) or nuplazid (pimavanserin).  We discussed these medications briefly but will not prescribe either of them. Nonetheless both can affect the QTc interval so will get an ECG today for baseline QTc.      He had a spell of intermittent atrial fibrillation - and had monitoring in [facility] - they have not had a followup on this with cardiology at the [facility] or at the . Will get an ECG today.       Signed up his wife Jesica Bates for proxy access to his medical records. Silvino lives in [facility] and she lives in [facility] and visits twice monthly.       They wish to come back for ongoing care.                 ______________________________________      Patient was asked about 14 Review  of systems including changes in vision (dry eyes, double vision), hearing, heart, lungs, musculoskeletal, depression, anxiety, snoring, RBD, insomnia, urinary frequency, urinary urgency, constipation, swallowing problems, hematological, ID, allergies, skin problems: seborrhea, endocrinological: thyroid, diabetes, cholesterol; balance, weight changes, and other neurological problems and these were not significant at this time except for   Patient Active Problem List   Diagnosis     Tremor     Hypercholesterolemia     Other seborrheic dermatitis     Psoriasis     Snores     GERD (gastroesophageal reflux disease)     Seasonal affective disorder (H)     Wears glasses     Voice disorders     Hearing loss     Tinnitus     Memory loss     Arthritis of hand, left     Shoulder pain, right     Family history of Parkinson's disease     Parkinson's disease (H)     Basal cell carcinoma of skin     Arthritis of left hand     Eczema     Esophageal reflux     Gait abnormality     Parkinson disease (H)     Prediabetes     Pure hypercholesterolemia     S/P shoulder surgery     Voice and resonance disorder     Intermittent atrial fibrillation (H)     Seborrheic dermatitis     Change in mole          Allergies   Allergen Reactions     Atorvastatin      History and Physical External     Pramipexole      rash     Simvastatin      Yellow Dye      Present in Sinemet     Past Surgical History:   Procedure Laterality Date     ENT SURGERY  25 yrs ago    thyroglossal cyst removed     GI SURGERY  2005    esophageal repair - nissen ? laparoscopic for reflux 2005     HERNIA REPAIR  2003    double hernia repair     KNEE SURGERY Right 23 yrs ago    thryoglossal cyst in right knee removed     SHOULDER SURGERY Right 03/2012    right rotator cuff - outopatient in [facility]     Past Medical History:   Diagnosis Date     Arthritis of hand, left 7/15/2011     Basal cell carcinoma of skin 6/14/2012     Family history of Parkinson's disease 7/15/2011      GERD (gastroesophageal reflux disease) 7/15/2011     Hearing loss 7/15/2011     Hypercholesterolemia 7/15/2011     Intermittent atrial fibrillation (H) 6/22/2018     Memory loss 7/15/2011     Other seborrheic dermatitis 7/15/2011     Parkinson's disease (H) 12/7/2011     Psoriasis 7/15/2011     Seasonal affective disorder (H) 7/15/2011     Seborrheic dermatitis 6/22/2018     Shoulder pain, right 7/15/2011     Snores 7/15/2011     Tinnitus 7/15/2011     Tremor 7/14/2011     Voice disorders 7/15/2011     Wears glasses 7/15/2011     Social History     Social History     Marital status:      Spouse name: N/A     Number of children: N/A     Years of education: N/A     Occupational History     Not on file.     Social History Main Topics     Smoking status: Former Smoker     Smokeless tobacco: Never Used      Comment: quit 1980     Alcohol use Yes      Comment: very little     Drug use: Not on file     Sexual activity: Not on file     Other Topics Concern     Not on file     Social History Narrative    Wife is from [place] and had a 1/3 of her stomach removed and is going back for more tests    Living in [place]    Has 2 daughters - adult    This is second marriage.    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     He has problems carrying on converstation        lives in [facility]. Jesica Jana is spouse       Drug and lactation database from the [place] [employer]:  http://toxnet.nlm.nih.gov/cgi-bin/sis/htmlgen?LACT      B/P: Data Unavailable, T: Data Unavailable, P: Data Unavailable, R: Data Unavailable 0 lbs 0 oz  There were no vitals taken for this visit., There is no height or weight on file to calculate BMI.  Medications and Vitals not listed above were documented in the cart and reviewed by me.     Current Outpatient Prescriptions    Medication Sig Dispense Refill     acetaminophen (TYLENOL) 325 MG tablet 2 x 325mg tabs by mouth twice daily - morning and evening 100 tablet      carbidopa-levodopa (SINEMET)  MG per tablet 2 tabs by mouth 3/day 90 tablet      Carboxymethylcellulose Sod PF (REFRESH PLUS) 0.5 % SOLN ophthalmic solution 1 drop in both eyes every 6 hours as needed 1 Bottle      cholecalciferol (VITAMIN D3) 47444 units capsule 50,000 units by mouth on the 5th of every month       Cyanocobalamin 100 MCG LOZG 100mcg by mouth daily       desonide (DESOWEN) 0.05 % cream Apply 0.5 inches topically 2 times daily.       donepezil (ARICEPT) 10 MG tablet 10mg by mouth once daily 30 tablet      Hydrocortisone (SCALP RELIEF ANTI-ITCH) 1 % SOLN Externally apply  topically as needed.       ketoconazole (NIZORAL) 2 % cream Apply  topically two times a day as needed (To seborrheic dermatitis of the scalp and nose area for maintenance.).       ketoconazole (NIZORAL) 2 % shampoo Apply  topically daily as needed for itching. Apply to the affected area and wash off after 5 minutes 210 mL 1     mirtazapine (REMERON) 30 MG tablet 30mg by mouth at bedtime 30 tablet      nystatin (MYCOSTATIN) ointment Apply to scrotum every 8 hours as needed       triamcinolone (KENALOG) 0.5 % cream Apply to face twice daily as needed       Vancomycin HCl in Dextrose 750-5 MG/150ML-% SOLN 50mg/ml - 125mg by mouth every other day for c diff           Arash Jung MD

## 2018-11-20 ENCOUNTER — CLINICAL UPDATE (OUTPATIENT)
Dept: PHARMACY | Facility: CLINIC | Age: 68
End: 2018-11-20

## 2018-11-20 ENCOUNTER — OFFICE VISIT (OUTPATIENT)
Dept: NEUROLOGY | Facility: CLINIC | Age: 68
End: 2018-11-20
Payer: COMMERCIAL

## 2018-11-20 VITALS — OXYGEN SATURATION: 96 % | DIASTOLIC BLOOD PRESSURE: 64 MMHG | SYSTOLIC BLOOD PRESSURE: 110 MMHG | HEART RATE: 60 BPM

## 2018-11-20 DIAGNOSIS — G20.A1 PARKINSON'S DISEASE (H): Primary | ICD-10-CM

## 2018-11-20 DIAGNOSIS — F80.0 ARTICULATION DISORDER: ICD-10-CM

## 2018-11-20 DIAGNOSIS — G20.A1 DEMENTIA DUE TO PARKINSON'S DISEASE WITH BEHAVIORAL DISTURBANCE (H): ICD-10-CM

## 2018-11-20 DIAGNOSIS — F02.818 DEMENTIA DUE TO PARKINSON'S DISEASE WITH BEHAVIORAL DISTURBANCE (H): ICD-10-CM

## 2018-11-20 DIAGNOSIS — G20.C PARKINSONISM, UNSPECIFIED PARKINSONISM TYPE (H): Primary | ICD-10-CM

## 2018-11-20 RX ORDER — ECHINACEA PURPUREA EXTRACT 125 MG
TABLET ORAL
COMMUNITY
Start: 2018-11-20

## 2018-11-20 RX ORDER — DONEPEZIL HYDROCHLORIDE 10 MG/1
10 TABLET, FILM COATED ORAL AT BEDTIME
Qty: 90 TABLET | Refills: 3 | COMMUNITY
Start: 2018-11-20

## 2018-11-20 RX ORDER — QUETIAPINE FUMARATE 25 MG/1
TABLET, FILM COATED ORAL
Qty: 30 TABLET | Refills: 1 | COMMUNITY
Start: 2018-11-20 | End: 2018-11-20

## 2018-11-20 RX ORDER — CARBIDOPA AND LEVODOPA 25; 100 MG/1; MG/1
TABLET ORAL
Qty: 540 TABLET | Refills: 3 | COMMUNITY
Start: 2018-11-20

## 2018-11-20 RX ORDER — MIDODRINE HYDROCHLORIDE 2.5 MG/1
TABLET ORAL
Qty: 180 TABLET | Refills: 3 | COMMUNITY
Start: 2018-11-20 | End: 2018-11-20

## 2018-11-20 RX ORDER — MIDODRINE HYDROCHLORIDE 2.5 MG/1
TABLET ORAL
Qty: 180 TABLET | Refills: 3 | Status: SHIPPED | OUTPATIENT
Start: 2018-11-20 | End: 2018-11-20

## 2018-11-20 RX ORDER — QUETIAPINE FUMARATE 25 MG/1
25 TABLET, FILM COATED ORAL AT BEDTIME
Qty: 90 TABLET | Refills: 11 | COMMUNITY
Start: 2018-11-20

## 2018-11-20 ASSESSMENT — PAIN SCALES - GENERAL: PAINLEVEL: NO PAIN (0)

## 2018-11-20 NOTE — LETTER
2018      RE: Silvino Bates  [address]  [facility] [address]       Diagnosis/Summary/Recommendations:    PATIENT: Silvino Bates  68 year old male     : 1950    ZACH: 2018    Parkinsonism    Going to a fitness gym 3 times per week  This is in [facility]      Wife is up for 10 days from [facility]     bp 111/60    Stomach usually never bothers him. He had constipation and had diarrhea before that.   He wonders if it is was a virus - something going around.     He is on sinemet. 7/8am, 1pm and 7/8pm  He sees Dr. Nash at the [facility]  He has seen Dr. Dumont in the past.     He is seeing shadows  Light shadows and dark shadows  Nothing definitive or well formed.   He is not seeing beings  Not sleeping very well.   He has problems falling and staying asleep.   He denies significant bladder problems  He has the gi problems as noted   He is not having heart burn  He may have a fib in the past.   He has not seen a cardiologist lately  He is not fainting.   He has seborrhea and may or may not be using product for this.   Eyes - has had some problems.  Hearing aides in place  No new skin cancer - had two premelanomas removed about one year ago.   Memory   Mood - denies depression  Has some anxiety.   Breathing is okay  Infections - none  No new allergies.   He has not had falls.   He denies freezing.   He does not have significant wearing off  He is taking medication for his cholesterol   His dizziness - not worse if he moves his head back and forth         Medications                      Acetaminophen 325mg    as needed               Carbidopa/levodopa 25/100 2 2 2         carboxymethylceullose sod fp refresh plus 0.5% soln opthalmic solution ? using       Cholecalciferol vitamin D3 50,000 units 5th day of the month            Cyanocobalamin vitamin b12 100mcg    1               Docusate sodium colace 100mg ? taking               Donepezil aricept 10mg or 5mg    1               Hydrocortisone scalp relief anti-itch 1% soln   ? taking               Ketoconazole nizoral 2% cream   prn               Ketoconazole nizoral 2% shampoo   prn               Linaclotide linzess 145mcg capsule   not taking               Midodrine proamatine 2.5mg   not taking               Mirtazapine 30mg remeron        1         Nystatin mycostatin ointment ? taking            Ondansetron zofran 4mg  ? taking              Polyethylene glycol miralax ? taking                Vitamin D 87631 units See above                                                                                                   History obtained from patient     PLAN  Big and loud therapy through the [facility].  He is on medication THAT WILL Need to be sorted out.   Firstly he has been on seroquel for about one month and not clear if this is controlling hallucinations and in particular the nature of the hallucinations. He is taking 1/2 of 25mg twice daily and wondering if he needs more at night than the morning.   Additionally today his blood pressure was on the lower side and presumably he is off midodrine/proamatine and not clear if all his blood pressure are on the lower side. If so may want to resume the midodrine/proamatine. His parkinson and the seroquel and sinemet can all lower blood pressure.   Lastly he has been taking aricept and maybe this can be administered at night rather than the morning.   Will have Marie Rose, Pharm D talk with the facility and will need to see if the above issues can be addressed or at least get a report from the facility to help a decision to be make in regards to:  1. Blood pressure and need for midodrine/proamatine  2. Hallucination control with seroquel  3. Anxiety  4. He would benefit from big and loud therapy     Return visit to see Daina in 2-3 months.     Wife will be moving to [facility] where her daughter will be living and is 4 hours away from the [facility] facility.        Coding statement:   Duration of  Services: patient care and care coordination was 25 minutes  Greater than 50% of this visit was spent in counseling and coordination of care.     Arash Jung MD     ______________________________________    Last visit date and details:      NO show     September 25, 2018     Parkinsonism     For physical therapy  [email or web address]   Phone: [phone]   [facility]   [address] [place] [id number] [place]   For speech therapy  Sarah [email or web address]   Phone: [phone]   [facility]   [address] [place] [address] [place]   Or       [email or web address]   Phone: [phone]   [facility]   [address] [place] [address] [place]      Medications                      Acetaminophen 325mg                   Carbidopa/levodopa 25/100 2 2 2         Cholecalciferol vitamin D3 50,000 units             Cyanocobalamin vitamin b12 100mcg                   Desonide desowen 0.05%                  Docusate sodium colace 100mg prn               Donepezil aricept 10mg or 5mg                  Hydrocortisone scalp relief anti-itch 1% soln                  Ketoconazole nizoral 2% cream                  Ketoconazole nizoral 2% shampoo                  Linaclotide linzess 145mcg capsule                  Midodrine proamatine 2.5mg                  Mirtazapine 30mg remeron                 Nystatin mycostatin ointment             Ondansetron zofran 4mg  ? taking              Polyethylene glycol miralax ? taking                Pyrithione zin 2% shampoo                  Rosuvastatin calcium crestor                   Sennosides senokot 8.6mg                   Triamcinolone kenalog 0.5% cream             Vancomycin 750-5mg/150 ml % soln             Vitamin D 5000 units See above                                                                                                             History obtained from patient        Coding statement:   Duration of  Services: patient care and care coordination was 0 minutes  Greater than 50% of this visit was spent in counseling and coordination of care.      Arash Jung MD      ______________________________________     Last visit date and details:       Parkinson disease with dementia - seen initially by me in 2011. Duration of PD is at least 7-8 yrs.   Both parents probably had parkinson  No one else in family has manifested with parkinson  There is a history of psychotic disorder in a brother who committed suicide and two sisters had breast cancer      Mobility wise he is fairly functional based on his gait and movement with his present sinemet medication regimen of 2 tabs 3/day      His cognition is affected with bradyphrenia - slowed responses and he also has memory impairment. He appears to have dementia and this may be parkinson disease with dementia (PDD) vs parkinson with alzheimer - mixed condition. PDD vs lewy body disease is a somewhat arbitrary separation and not necessarily that important in management      His behavior has improved from when he was living in [facility] [facility] and where he is now there are not these issues      His hallucinations persist but are not disabling and he is not on seroquel (quetiapine) or nuplazid (pimavanserin).  We discussed these medications briefly but will not prescribe either of them. Nonetheless both can affect the QTc interval so will get an ECG today for baseline QTc.      He had a spell of intermittent atrial fibrillation - and had monitoring in [facility] - they have not had a followup on this with cardiology at the [facility] or at the . Will get an ECG today.       Signed up his wife Jesica Bates for proxy access to his medical records. Silvino lives in [facility] and she lives in [facility] and visits twice monthly.       They wish to come back  for ongoing care.                 ______________________________________      Patient was asked about 14 Review of systems including changes in vision (dry eyes, double vision), hearing, heart, lungs, musculoskeletal, depression, anxiety, snoring, RBD, insomnia, urinary frequency, urinary urgency, constipation, swallowing problems, hematological, ID, allergies, skin problems: seborrhea, endocrinological: thyroid, diabetes, cholesterol; balance, weight changes, and other neurological problems and these were not significant at this time except for   Patient Active Problem List   Diagnosis     Tremor     Hypercholesterolemia     Other seborrheic dermatitis     Psoriasis     Snores     GERD (gastroesophageal reflux disease)     Seasonal affective disorder (H)     Wears glasses     Voice disorders     Hearing loss     Tinnitus     Memory loss     Arthritis of hand, left     Shoulder pain, right     Family history of Parkinson's disease     Parkinson's disease (H)     Basal cell carcinoma of skin     Arthritis of left hand     Eczema     Esophageal reflux     Gait abnormality     Parkinson disease (H)     Prediabetes     Pure hypercholesterolemia     S/P shoulder surgery     Voice and resonance disorder     Intermittent atrial fibrillation (H)     Seborrheic dermatitis     Change in mole          Allergies   Allergen Reactions     Atorvastatin      History and Physical External     Pramipexole      rash     Simvastatin      Yellow Dye      Present in Sinemet     Past Surgical History:   Procedure Laterality Date     ENT SURGERY  25 yrs ago    thyroglossal cyst removed     GI SURGERY  2005    esophageal repair - nissen ? laparoscopic for reflux 2005     HERNIA REPAIR  2003    double hernia repair     KNEE SURGERY Right 23 yrs ago    thryoglossal cyst in right knee removed     SHOULDER SURGERY Right 03/2012    right rotator cuff - outopatient in [facility]     Past Medical History:   Diagnosis Date     Arthritis of hand,  left 7/15/2011     Basal cell carcinoma of skin 6/14/2012     Family history of Parkinson's disease 7/15/2011     GERD (gastroesophageal reflux disease) 7/15/2011     Hearing loss 7/15/2011     Hypercholesterolemia 7/15/2011     Intermittent atrial fibrillation (H) 6/22/2018     Memory loss 7/15/2011     Other seborrheic dermatitis 7/15/2011     Parkinson's disease (H) 12/7/2011     Psoriasis 7/15/2011     Seasonal affective disorder (H) 7/15/2011     Seborrheic dermatitis 6/22/2018     Shoulder pain, right 7/15/2011     Snores 7/15/2011     Tinnitus 7/15/2011     Tremor 7/14/2011     Voice disorders 7/15/2011     Wears glasses 7/15/2011     Social History     Social History     Marital status:      Spouse name: N/A     Number of children: N/A     Years of education: N/A     Occupational History     Not on file.     Social History Main Topics     Smoking status: Former Smoker     Smokeless tobacco: Never Used      Comment: quit 1980     Alcohol use Yes      Comment: very little     Drug use: Not on file     Sexual activity: Not on file     Other Topics Concern     Not on file     Social History Narrative    Wife is from [place] and had a 1/3 of her stomach removed and is going back for more tests    Living in [place]    Has 2 daughters - adult    This is second marriage.    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     He has problems carrying on converstation        lives in [place]. Jesica Jana is spouse       Drug and lactation database from the [place] [employer]:  http://toxnet.nlm.nih.gov/cgi-bin/sis/htmlgen?LACT      B/P: Data Unavailable, T: Data Unavailable, P: Data Unavailable, R: Data Unavailable 0 lbs 0 oz  There were no vitals taken for this visit., There is no height or weight on file to calculate BMI.  Medications and  Vitals not listed above were documented in the cart and reviewed by me.     Current Outpatient Prescriptions   Medication Sig Dispense Refill     acetaminophen (TYLENOL) 325 MG tablet 2 x 325mg tabs by mouth twice daily - morning and evening 100 tablet      carbidopa-levodopa (SINEMET)  MG per tablet 2 tabs by mouth 3/day 90 tablet      Carboxymethylcellulose Sod PF (REFRESH PLUS) 0.5 % SOLN ophthalmic solution 1 drop in both eyes every 6 hours as needed 1 Bottle      cholecalciferol (VITAMIN D3) 41960 units capsule 50,000 units by mouth on the 5th of every month       Cyanocobalamin 100 MCG LOZG 100mcg by mouth daily       desonide (DESOWEN) 0.05 % cream Apply 0.5 inches topically 2 times daily.       donepezil (ARICEPT) 10 MG tablet 10mg by mouth once daily 30 tablet      Hydrocortisone (SCALP RELIEF ANTI-ITCH) 1 % SOLN Externally apply  topically as needed.       ketoconazole (NIZORAL) 2 % cream Apply  topically two times a day as needed (To seborrheic dermatitis of the scalp and nose area for maintenance.).       ketoconazole (NIZORAL) 2 % shampoo Apply  topically daily as needed for itching. Apply to the affected area and wash off after 5 minutes 210 mL 1     mirtazapine (REMERON) 30 MG tablet 30mg by mouth at bedtime 30 tablet      nystatin (MYCOSTATIN) ointment Apply to scrotum every 8 hours as needed       triamcinolone (KENALOG) 0.5 % cream Apply to face twice daily as needed       Vancomycin HCl in Dextrose 750-5 MG/150ML-% SOLN 50mg/ml - 125mg by mouth every other day for c diff           Arash Jung MD    [facility] to [facility] in [facility] per provider request. Patient is in clinic. Fax received same day as patient appointment 1/20/2018    Arash Jung MD

## 2018-11-20 NOTE — PROGRESS NOTES
[facility], MA to enter in med list per provider request. Patient is in clinic. Fax received same day as patient appointment 1/20/2018

## 2018-11-20 NOTE — Clinical Note
I talked directly with Dr. Nash. We can touch base or you can read my note. He will plan to move quetiapine to donepezil to HS but will hold off on starting midodrine until he sees him again.

## 2018-11-20 NOTE — PROGRESS NOTES
Clinical Update:                                                    At the request of Dr. Jung, a chart review was conducted for Silvino Bates.    Reason for Chart Review: Patient is hypotensive today in clinic (and dizzy) and has had multiple recent medication changes (midodrine and quetiapine), which Dr. Jung would like me to evaluate.    Discussion:   Spoke with Dr. Nash at the [facility] ([phone]). Dr. Nash states that he last saw the patient 10/25/18 and at that time he started quetiapine 12.5 mg twice daily because the patient was having increased anxiety and visual hallucinations. He also had some intrusive thoughts, including 6-7 other people with PD wanting to talk with him (also a hallucination). Hallucinations have also included bright lights and animals in his peripheral vision. Patient has not been seen by Dr. Nash or their nurse practitioner since quetiapine was started.     As for blood pressure, he is not currently on midodrine. His most recent BP this month at the [facility] was 155/91. He has had some low BP's including  in mid-October, 95 in late-Sept, and some high SBP's including above 160. Per Dr. Nash, average BP since admission to [facility] has been about 120/75.     Discussed Dr. Jung's recommendations to Dr. Nash, including moving full quetiapine dose and donepezil dose to bedtime and considering restarting midodrine. Dr. Nash agrees to move quetiapine and donepezil to bedtime, but will not start midodrine today. He will plan to follow up with the patient and continue to monitor his BP's.     Plan:  1. Dr. Nash to switch quetiapine to 25 mg nightly and donepezil to 10 mg nightly.   2. Dr. Nash to monitor BP and may consider initiating midodrine if BP's are consistently low and patient is symptomatic.    Marie Rose, Pharm.D.  Medication Therapy Management Pharmacist  Phone: [phone]

## 2018-11-20 NOTE — Clinical Note
11/20/2018       RE: Silvino Bates  [address]  [facility] [address]     Dear Colleague,    Thank you for referring your patient, Silvino Bates, to the [facility] at [facility]. Please see a copy of my visit note below.    No notes on file    Again, thank you for allowing me to participate in the care of your patient.      Sincerely,    Arash Jung MD

## 2018-11-20 NOTE — MR AVS SNAPSHOT
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 He had constipation and had diarrhea before that.   He wonders if it is was a virus - something going around.     He is on sinemet. 7/8am, 1pm and 7/8pm  He sees Dr. Nash at the [facility]  He has seen Dr. Dumont in the past.     He is seeing shadows  Light shadows and dark shadows  Nothing definitive or well formed.   He is not seeing beings  Not sleeping very well.   He has problems falling and staying asleep.   He denies significant bladder problems  He has the gi problems as noted   He is not having heart burn  He may have a fib in the past.   He has not seen a cardiologist lately  He is not fainting.   He has seborrhea and may or may not be using product for this.   Eyes - has had some problems.  Hearing aides in place  No new skin cancer - had two premelanomas removed about one year ago.   Memory   Mood - denies depression  Has some anxiety.   Breathing is okay  Infections - none  No new allergies.   He has not had falls.   He denies freezing.   He does not have significant wearing off  He is taking medication for his cholesterol   His dizziness - not worse if he moves his head back and forth         Medications                      Acetaminophen 325mg    as needed               Carbidopa/levodopa 25/100 2 2 2          carboxymethylceullose sod fp refresh plus 0.5% soln opthalmic solution ? using       Cholecalciferol vitamin D3 50,000 units 5th day of the month            Cyanocobalamin vitamin b12 100mcg    1               Docusate sodium colace 100mg ? taking               Donepezil aricept 10mg or 5mg   1               Hydrocortisone scalp relief anti-itch 1% soln   ? taking               Ketoconazole nizoral 2% cream   prn               Ketoconazole nizoral 2% shampoo   prn               Linaclotide linzess 145mcg capsule   not taking               Midodrine proamatine 2.5mg   not taking               Mirtazapine 30mg remeron        1         Nystatin mycostatin ointment ? taking            Ondansetron zofran 4mg  ? taking              Polyethylene glycol miralax ? taking                Vitamin D 07024 units See above                                                                                                   History obtained from patient     PLAN  Big and loud therapy through the [facility].  He is on medication THAT WILL Need to be sorted out.   Firstly he has been on seroquel for about one month and not clear if this is controlling hallucinations and in particular the nature of the hallucinations. He is taking 1/2 of 25mg twice daily and wondering if he needs more at night than the morning.   Additionally today his blood pressure was on the lower side and presumably he is off midodrine/proamatine and not clear if all his blood pressure are on the lower side. If so may want to resume the midodrine/proamatine. His parkinson and the seroquel and sinemet can all lower blood pressure.   Lastly he has been taking aricept and maybe this can be administered at night rather than the morning.   Will have Marie Rose, Pharm D talk with the facility and will need to see if the above issues can be addressed or at least get a report from the facility to help a decision to be make in regards to:  1. Blood pressure and need for  midodrine/proamatine  2. Hallucination control with seroquel  3. Anxiety  4. He would benefit from big and loud therapy     Return visit to see Daina in 2-3 months.     Wife will be moving to [facility] where her daughter will be living and is 4 hours away from the [facility] facility.           Follow-ups after your visit        Additional Services     PHYSICAL THERAPY REFERRAL       If you have not heard from the scheduling office within 2 business days, please call [phone] for all locations, with the exception of [place], please call [phone] and [place], please call [phone].    Please be aware that coverage of these services is subject to the terms and limitations of your health insurance plan.  Call member services at your health plan with any benefit or coverage questions.            SPEECH THERAPY REFERRAL       If you have not heard from the scheduling office within 2 business days, please call [phone] for all locations, with the exception of [place], please call [phone] and [place], please call [phone].    Please be aware that coverage of these services is subject to the terms and limitations of your health insurance plan.  Call member services at your health plan with any benefit or coverage questions.                  Follow-up notes from your care team     Return in about 3 months (around 2/20/2019).      Your next 10 appointments already scheduled     Feb 20, 2019 11:20 AM CST   (Arrive by 11:05 AM)   New Movement Disorder with SHARATH Rueda [facility] Neurology ([facility])    [address] [place] [address]  [address] [facility] [address] [phone]              Future tests that were ordered for you today     Open Future Orders        Priority Expected Expires Ordered    SPEECH THERAPY REFERRAL Routine  11/20/2019 11/20/2018    PHYSICAL THERAPY REFERRAL Routine  11/20/2019 11/20/2018            Who to contact      Please call your clinic at [phone] to:    Ask questions about your health    Make or cancel appointments    Discuss your medicines    Learn about your test results    Speak to your doctor            Additional Information About Your Visit        Care EveryWhere ID     This is your Care EveryWhere ID. This could be used by other organizations to access your [place] medical records  [id number]        Your Vitals Were     Pulse Pulse Oximetry                60 96%           Blood Pressure from Last 3 Encounters:   11/20/18 110/64   06/22/18 131/75   06/14/12 143/98    Weight from Last 3 Encounters:   06/22/18 68.5 kg (151 lb)   06/14/12 80.7 kg (178 lb)   12/08/11 81.6 kg (180 lb)                 Today's Medication Changes          These changes are accurate as of 11/20/18 10:04 AM.  If you have any questions, ask your nurse or doctor.               These medicines have changed or have updated prescriptions.        Dose/Directions    carbidopa-levodopa  MG per tablet   Commonly known as:  SINEMET   This may have changed:  additional instructions   Used for:  Parkinsonism, unspecified Parkinsonism type (H)   Changed by:  Arash Jung MD        2 tabs by mouth 3/day @ 7/8am, 1pm and 7/8pm = 6/day   Quantity:  540 tablet   Refills:  3       cyanocobalamin 100 MCG Tabs tablet   Commonly known as:  vitamin  B-12   This may have changed:  Another medication with the same name was removed. Continue taking this medication, and follow the directions you see here.   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 2.5mg tab by mouth every morning and late afternoon   Quantity:  180 tablet   Refills:  3       vitamin D3 63982 units capsule   Commonly known as:  CHOLECALCIFEROL   This may have changed:  additional instructions   Changed by:  Arash Jung MD        50,000 units by mouth on the 5th day of every month   Refills:  0         Stop taking these medicines if you haven't already. Please contact your care team if you have questions.     Vancomycin HCl in Dextrose 750-5 MG/150ML-% Soln   Stopped by:  Arash Jung MD                Where to get your medicines      Some of these will need a paper prescription and others can be bought over the counter.  Ask your nurse if you have questions.     Bring a paper prescription for each of these medications     midodrine 2.5 MG tablet                Primary Care Provider Office Phone # Fax #    [facility] ERICK Anne [phone]       [address] [address]        Equal Access to Services     [facility]: Hadii aad ku hadasho Soomaali, waaxda luqadaha, qaybta kaalmada adeegyada, keila anderson. So [facility] [phone].    ATENCIÓN: Si habla español, tiene a gallagher disposición servicios gratuitos de asistencia lingüística. [facility] [phone].    We comply with applicable federal civil rights laws and [place] laws. We do not discriminate on the basis of race, color, national origin, age, disability, sex, sexual orientation, or gender identity.            Thank you!     Thank you for choosing [facility] NEUROLOGY  for your care. Our goal is always to provide you with excellent care. Hearing back from  our patients is one way we can continue to improve our services. Please take a few minutes to complete the written survey that you may receive in the mail after your visit with us. Thank you!             Your Updated Medication List - Protect others around you: Learn how to safely use, store and throw away your medicines at www.disposemymeds.org.          This list is accurate as of 11/20/18 10:04 AM.  Always use your most recent med list.                   Brand Name Dispense Instructions for use Diagnosis    carbidopa-levodopa  MG per tablet    SINEMET    540 tablet    2 tabs by mouth 3/day @ 7/8am, 1pm and 7/8pm = 6/day    Parkinsonism, unspecified Parkinsonism type (H)       Carboxymethylcellulose Sod PF 0.5 % Soln ophthalmic solution    REFRESH PLUS    1 Bottle    1 drop in both eyes every 6 hours as needed        cyanocobalamin 100 MCG Tabs tablet    vitamin  B-12    150 tablet    Take 1 tablet (100 mcg) by mouth daily        desonide 0.05 % cream    DESOWEN     Apply 0.5 inches topically 2 times daily.        donepezil 10 MG tablet    ARICEPT    90 tablet    Take 1 tablet (10 mg) by mouth daily 10mg by mouth once daily    Dementia due to Parkinson's disease with behavioral disturbance (H)       * ketoconazole 2 % cream    NIZORAL     Apply  topically two times a day as needed (To seborrheic dermatitis of the scalp and nose area for maintenance.).        * ketoconazole 2 % shampoo    NIZORAL    210 mL    Apply  topically daily as needed for itching. Apply to the affected area and wash off after 5 minutes    Paralysis agitans (H)       midodrine 2.5 MG tablet    PROAMATINE    180 tablet    May need to resume ?? 2.5mg tab by mouth every morning and late afternoon    Parkinsonism, unspecified Parkinsonism type (H)       mirtazapine 30 MG tablet    REMERON    30 tablet    30mg by mouth at bedtime        NONFORMULARY      Muscle rub cream 10-15% apply to left shoulder at bedtime        nystatin ointment     MYCOSTATIN     Apply to scrotum every 8 hours as needed        SCALP RELIEF ANTI-ITCH 1 % Soln   Generic drug:  hydrocortisone      Externally apply  topically as needed.        SEROQUEL 25 MG tablet   Generic drug:  QUEtiapine     90 tablet    1/2 of 25mg tab by mouth twice daily    Dementia due to Parkinson's disease with behavioral disturbance (H)       sodium chloride 0.65 % nasal spray    OCEAN     2 sprays in nose twice daily for dry nose        triamcinolone 0.5 % cream    KENALOG     Apply to face twice daily as needed        TYLENOL 325 MG tablet   Generic drug:  acetaminophen     100 tablet    2 x 325mg tabs by mouth twice daily - morning and evening        vitamin D3 52447 units capsule    CHOLECALCIFEROL     50,000 units by mouth on the 5th day of every month        * Notice:  This list has 2 medication(s) that are the same as other medications prescribed for you. Read the directions carefully, and ask your doctor or other care provider to review them with you.

## 2018-11-20 NOTE — PATIENT INSTRUCTIONS
: 1950    ZACH: 2018    Parkinsonism    Going to a fitness gym 3 times per week  This is in [facility]      Wife is up for 10 days from [facility]     bp 111/60    Stomach usually never bothers him. He had constipation and had diarrhea before that.   He wonders if it is was a virus - something going around.     He is on sinemet. 7/8am, 1pm and 7/8pm  He sees Dr. Nash at the [facility]  He has seen Dr. Dumont in the past.     He is seeing shadows  Light shadows and dark shadows  Nothing definitive or well formed.   He is not seeing beings  Not sleeping very well.   He has problems falling and staying asleep.   He denies significant bladder problems  He has the gi problems as noted   He is not having heart burn  He may have a fib in the past.   He has not seen a cardiologist lately  He is not fainting.   He has seborrhea and may or may not be using product for this.   Eyes - has had some problems.  Hearing aides in place  No new skin cancer - had two premelanomas removed about one year ago.   Memory   Mood - denies depression  Has some anxiety.   Breathing is okay  Infections - none  No new allergies.   He has not had falls.   He denies freezing.   He does not have significant wearing off  He is taking medication for his cholesterol   His dizziness - not worse if he moves his head back and forth         Medications                      Acetaminophen 325mg    as needed               Carbidopa/levodopa 25/100 2 2 2         carboxymethylceullose sod fp refresh plus 0.5% soln opthalmic solution ? using       Cholecalciferol vitamin D3 50,000 units 5th day of the month            Cyanocobalamin vitamin b12 100mcg    1               Docusate sodium colace 100mg ? taking               Donepezil aricept 10mg or 5mg   1               Hydrocortisone scalp relief anti-itch 1% soln   ? taking               Ketoconazole nizoral 2% cream   prn               Ketoconazole nizoral 2% shampoo   prn                Linaclotide linzess 145mcg capsule   not taking               Midodrine proamatine 2.5mg   not taking               Mirtazapine 30mg remeron        1         Nystatin mycostatin ointment ? taking            Ondansetron zofran 4mg  ? taking              Polyethylene glycol miralax ? taking                Vitamin D 70646 units See above                                                                                                   History obtained from patient     PLAN  Big and loud therapy through the [facility].  He is on medication THAT WILL Need to be sorted out.   Firstly he has been on seroquel for about one month and not clear if this is controlling hallucinations and in particular the nature of the hallucinations. He is taking 1/2 of 25mg twice daily and wondering if he needs more at night than the morning.   Additionally today his blood pressure was on the lower side and presumably he is off midodrine/proamatine and not clear if all his blood pressure are on the lower side. If so may want to resume the midodrine/proamatine. His parkinson and the seroquel and sinemet can all lower blood pressure.   Lastly he has been taking aricept and maybe this can be administered at night rather than the morning.   Will have Marie Rose, Pharm D talk with the facility and will need to see if the above issues can be addressed or at least get a report from the facility to help a decision to be make in regards to:  1. Blood pressure and need for midodrine/proamatine  2. Hallucination control with seroquel  3. Anxiety  4. He would benefit from big and loud therapy     Return visit to see Daina in 2-3 months.     Wife will be moving to [facility] where her daughter will be living and is 4 hours away from the [facility] va facility.

## 2018-11-20 NOTE — NURSING NOTE
Chief Complaint   Patient presents with     RECHECK     [facility] RETURN - 3 MONTH F/U       Shaheen Sandhu, EMT

## 2018-11-20 NOTE — LETTER
11/20/2018       RE: Silvino Bates  [address]  [place] [address]     Dear Colleague,    Thank you for referring your patient, Silvino Bates, to the [facility] NEUROLOGY at [facility]. Please see a copy of my visit note below.    No notes on file    Again, thank you for allowing me to participate in the care of your patient.      Sincerely,    Arash Jung MD

## 2018-12-06 ENCOUNTER — TELEPHONE (OUTPATIENT)
Dept: NEUROLOGY | Facility: CLINIC | Age: 68
End: 2018-12-06

## 2018-12-06 NOTE — TELEPHONE ENCOUNTER
Faxed physical therapy and speech therapy orders to the [facility]'s [place] [phone]:

## 2018-12-18 ENCOUNTER — TELEPHONE (OUTPATIENT)
Dept: NEUROLOGY | Facility: CLINIC | Age: 68
End: 2018-12-18

## 2018-12-18 NOTE — TELEPHONE ENCOUNTER
[facility] Call Center    Phone Message    May a detailed message be left on voicemail: yes    Reason for Call: Other: [facility], pt needs to have a Big and Loud appt done and pt's wife is wondering if someone can [facility] to the [facility] to [facility] instead of him having to travel to the clinic, please call to discuss     Action Taken: Message routed to:  [facility] ([facility] Neurology Clinic

## 2018-12-19 NOTE — TELEPHONE ENCOUNTER
Called Jesica back and let her know that we can not send out a physical therapist to the [facility] facility for big and loud therapy. I informed her that I did call the [facility] facility and they do provide physical therapy there. I let her know that if someone there is trained in big and loud therapy they can provide that service for Ashley

## 2019-01-03 ENCOUNTER — RECORDS - HEALTHEAST (OUTPATIENT)
Dept: LAB | Facility: CLINIC | Age: 69
End: 2019-01-03

## 2019-01-04 LAB — 25(OH)D3 SERPL-MCNC: 36.6 NG/ML (ref 30–80)

## 2019-03-18 ENCOUNTER — OFFICE VISIT (OUTPATIENT)
Dept: NEUROLOGY | Facility: CLINIC | Age: 69
End: 2019-03-18
Payer: COMMERCIAL

## 2019-03-18 VITALS
BODY MASS INDEX: 21.77 KG/M2 | HEIGHT: 69 IN | HEART RATE: 68 BPM | DIASTOLIC BLOOD PRESSURE: 82 MMHG | OXYGEN SATURATION: 97 % | SYSTOLIC BLOOD PRESSURE: 127 MMHG | RESPIRATION RATE: 16 BRPM | TEMPERATURE: 97.8 F | WEIGHT: 147 LBS

## 2019-03-18 DIAGNOSIS — G20.A1 PARKINSON'S DISEASE (H): ICD-10-CM

## 2019-03-18 DIAGNOSIS — G20.A1 DEMENTIA DUE TO PARKINSON'S DISEASE WITHOUT BEHAVIORAL DISTURBANCE (H): ICD-10-CM

## 2019-03-18 DIAGNOSIS — F02.80 DEMENTIA DUE TO PARKINSON'S DISEASE WITHOUT BEHAVIORAL DISTURBANCE (H): ICD-10-CM

## 2019-03-18 DIAGNOSIS — R44.1 VISUAL HALLUCINATIONS: Primary | ICD-10-CM

## 2019-03-18 DIAGNOSIS — F32.0 MILD MAJOR DEPRESSION (H): ICD-10-CM

## 2019-03-18 ASSESSMENT — PAIN SCALES - GENERAL: PAINLEVEL: MILD PAIN (2)

## 2019-03-18 ASSESSMENT — MIFFLIN-ST. JEOR: SCORE: 1417.17

## 2019-03-18 NOTE — NURSING NOTE
Chief Complaint   Patient presents with     Parkinson     [facility] NEW MOVEMENT DISORDER 3 MONTH F/U     Tremors     Gait Problem       Blaze Baxter, EMT

## 2019-03-18 NOTE — PROGRESS NOTES
PATIENT: Silvino Bates    : 1950    ZACH: 2019    REASON FOR VISIT: Parkinson's disease (PD) and dementia follow up.    HPI: Mr. Silvino Bates is a 68 year old  male who came to the [facility] neurology clinic accompanied by his wife for a follow up visit.  This is a 2nd marriage for both. They've been  for 20 years.  He was last seen in the clinic on 2018 by Dr. Jung for a routine f/u visit.  During that visit, the following were discussed: -    PLAN    Big and loud therapy through the [facility].  He is on medication THAT WILL Need to be sorted out.   Firstly he has been on seroquel for about one month and not clear if this is controlling hallucinations and in particular the nature of the hallucinations. He is taking 1/2 of 25mg twice daily and wondering if he needs more at night than the morning.   Additionally today his blood pressure was on the lower side and presumably he is off midodrine/proamatine and not clear if all his blood pressure are on the lower side. If so may want to resume the midodrine/proamatine. His parkinson and the seroquel and sinemet can all lower blood pressure.   Lastly he has been taking aricept and maybe this can be administered at night rather than the morning.   Will have Marie Rose, Pharm D talk with the facility and will need to see if the above issues can be addressed or at least get a report from the facility to help a decision to be make in regards to:    1. Blood pressure and need for midodrine/proamatine  2. Hallucination control with seroquel  3. Anxiety  4. He would benefit from big and loud therapy      Return visit to see Daina in 2-3 months.      Wife will be moving to [facility] where her daughter will be living and is 4 hours away from the [facility] facility.     Arash Jung MD        Pt reports that he was diagnosed with PD about 6- 7 years ago. His wife agreed.  [facility]  He [facility] about [facility].   "His wife has moved to [place] & now has moved to an apartment.  There is a heated pool, which she plans to bring her  to stay with her for a few days as needed.  She comes to visit him monthly.  His daughter visits him twice a week.    His wife reports that he didn't get to do the Big and Loud Therapy as he doesn't have anyone taking him to appointments.  She is not sure if [facility].  He is going to the gym.    He uses a cane.  He reports tremors are not too bad.  He gets assistance when he showers.  He has freezing of gait.  He said, \"it's pretty good now.\"     He has some visual hallucinations that are \"insignificant.\"  He gave an example of seeing \"furry critters.\" This is not bothersome to him.  Wife reports that since he was started on Seroquel, hallucinations have improved.      He has dementia.  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     Wife reports that they monitor his BP closely.    He has been seen by a psychiatrist twice; on 2/28 & 3/7.  He was recently stared on Escitalopram 10 mg in the morning to manage depression in addition to Mirtazapine 30 mg at HS.  He reports depression is improving.     His wife plans to take him to the hotel where she's staying for a few days.  She wants to take him to the heated pool.  She asked if this is okay.       MEDICATIONS:   Medication Sig     acetaminophen (TYLENOL) 325 MG tablet 2 x 325mg tabs by mouth twice daily - morning and evening     carbidopa-levodopa (SINEMET)  MG per tablet 2 tabs by mouth 3/day @ 7/8am, 1pm and 7/8pm = 6/day     Carboxymethylcellulose Sod PF (REFRESH PLUS) 0.5 % SOLN ophthalmic solution 1 drop in both eyes every 6 hours as needed     cyanocobalamin (VITAMIN  B-12) 100 MCG TABS tablet Take 1 tablet (100 mcg) by mouth daily     desonide (DESOWEN) 0.05 % cream Apply 0.5 inches topically 2 times daily.     donepezil (ARICEPT) 10 MG tablet " "Take 10 mg by mouth At Bedtime      ESCITALOPRAM OXALATE PO Take 10 mg by mouth daily     Hydrocortisone (SCALP RELIEF ANTI-ITCH) 1 % SOLN Externally apply  topically as needed.     ketoconazole (NIZORAL) 2 % cream Apply  topically two times a day as needed (To seborrheic dermatitis of the scalp and nose area for maintenance.).     ketoconazole (NIZORAL) 2 % shampoo Apply  topically daily as needed for itching. Apply to the affected area and wash off after 5 minutes     metoprolol succinate (TOPROL-XL) 12.5 mg TABS half-tab Take by mouth daily     mirtazapine (REMERON) 30 MG tablet 30mg by mouth at bedtime     NONFORMULARY Muscle rub cream 10-15% apply to left shoulder at bedtime     nystatin (MYCOSTATIN) ointment Apply to scrotum every 8 hours as needed     QUEtiapine (SEROQUEL) 25 MG tablet Take 25 mg by mouth At Bedtime      sodium chloride (OCEAN) 0.65 % nasal spray 2 sprays in nose twice daily for dry nose     triamcinolone (KENALOG) 0.5 % cream Apply to face twice daily as needed     vitamin D3 (CHOLECALCIFEROL) 77952 units capsule 50,000 units by mouth on the 5th day of every month       ALLERGIES: Atorvastatin; Pramipexole; Simvastatin; and Yellow dye      PHYSICAL EXAM:    VITAL SIGNS:  Blood pressure 127/82, pulse 68, temperature 97.8  F (36.6  C), temperature source Oral, resp. rate 16, height 1.753 m (5' 9\"), weight 66.7 kg (147 lb), SpO2 97 %. Body mass index is 21.71 kg/m .    GENERAL:  Mr. Bates is a pleasant  male who is well-groomed, well-developed and thin sitting comfortably in the exam room without any distress.  Affect is appropriate.    MOVEMENT DISORDERS ASSESSMENT: (Last Sinemet was unknown)  Speech: Monotone, slurred but understandable; moderately impaired.  Facial Expression: Slight, abnormal diminution of facial expression.  Rest Tremor: Mild in chin, RLE & LLE that worsen with mental activation.   Action/Postural Tremor: Slight postural tremors and mild action tremors " bilaterally.  Rigidity:  Increased tone; mild to moderate in all extremities.   Finger Taps: Mild slowing and reduction in amplitude in Rt; Moderately impaired in Lt.    Hand opening & closing: Moderately impaired; occasional arrests in movement bilaterally.    Hand pronation & supination: Moderately impaired in Rt; Severely impaired in Lt.   Toe Tapping:  Mild slowing and reduction in amplitude in Rt; Moderately impaired in Lt.    Leg Agility: Mild slowing and reduction in amplitude bilaterally.    Arising from chair - arms folded across chest: Pushes self up from arms of seat.  Posture: Moderately stooped posture, slightly leaning to one side.  Gait: Walks slowly, shuffles with short steps. No festination or propulsion.  Freezing of gait:  None noted.   Postural Stability: Absence of postural response; would fall if not caught by examiner.  Body Bradykinesia: Mild degree of slowness and poverty of movement.   Dyskinesias:  Absent.     ASSESSMENT:    1)  Parkinson's Disease:  Patient has a 6 - 7 year history of PD.  Motor symptoms are stable.     2)  Visual hallucinations:  Manageable on Seroquel 25 mg at HS.      3)  Depression:  Seeing a psychiatrist.  He was on Mirtazapine 30 mg and recently stared on Escitalopram 10 mg.     4)  Dementia:  Gradually worsening.  [facility]  On Donepezil.  No behavioral issues.       PLAN:    __  Will stay on the same antiparkinsonian medications.   __  Will continue on the same dose of Quetiapine 25 mg at HS.    __  Will follow up with psychiatrist to manage mood.   __  Will stay on the same dose of Donepezil 10 mg at HS.   __  Encouraged to continue staying active.  __  Okay to go out with wife & walk in a warm swimming pool.     Will return to our clinic in 6 months or sooner as needed.    The total time spent with the patient was 45 minutes, and greater than 50% of this time was spent in counseling and coordination of care.    Daina Ring, SHARATH,  CNP  P  Neurology Clinic    10:31 AM  11:16 AM

## 2019-03-18 NOTE — PATIENT INSTRUCTIONS
March 18, 2019    Dear . Silvino Aguilar Jana,    Thank you for coming today.  During your visit, we have discussed the following:     __  Stay on the same antiparkinsonian medications.   __  Continue on the same dose of Qutaiapine to help with hallucinaions.   __  Follow up with your psychiatrist to manage mood.   __  Continue to stay active.  __  Okay to go out with wife & walk in a warm swimming pool.   __  Return in 6 months. You may return sooner as needed.      For questions, you may send us a Ofelia Feliz message or call [phone]    Fax number: [phone]    SHARATH Coon, CNP  [facility]

## 2019-03-18 NOTE — LETTER
RE: Silvino Bates  [address]  [place] [address]       PATIENT: Silvino Bates    : 1950    ZACH: 2019    REASON FOR VISIT: Parkinson's disease (PD) and dementia follow up.    HPI: Mr. Silvino Bates is a 68 year old  male who came to the [facility] neurology clinic accompanied by his wife for a follow up visit.  This is a 2nd marriage for both. They've been  for 20 years.  He was last seen in the clinic on 2018 by Dr. Jung for a routine f/u visit.  During that visit, the following were discussed: -    PLAN    Big and loud therapy through the [facility].  He is on medication THAT WILL Need to be sorted out.   Firstly he has been on seroquel for about one month and not clear if this is controlling hallucinations and in particular the nature of the hallucinations. He is taking 1/2 of 25mg twice daily and wondering if he needs more at night than the morning.   Additionally today his blood pressure was on the lower side and presumably he is off midodrine/proamatine and not clear if all his blood pressure are on the lower side. If so may want to resume the midodrine/proamatine. His parkinson and the seroquel and sinemet can all lower blood pressure.   Lastly he has been taking aricept and maybe this can be administered at night rather than the morning.   Will have Marie Rose, Pharm D talk with the facility and will need to see if the above issues can be addressed or at least get a report from the facility to help a decision to be make in regards to:    1. Blood pressure and need for midodrine/proamatine  2. Hallucination control with seroquel  3. Anxiety  4. He would benefit from big and loud therapy      Return visit to see Daina in 2-3 months.      Wife will be moving to [facility] where her daughter will be living and is 4 hours away from the [facility] facility.     Arash Jung MD        Pt reports that he was diagnosed with PD about 6- 7  "years ago. His wife agreed.  [facility]  He moved there about a year ago.  His wife has moved to [place] & now has moved to an apartment.  There is a heated pool, which she plans to bring her  to stay with her for a few days as needed.  She comes to visit him monthly.  His daughter visits him twice a week.    His wife reports that he didn't get to do the Big and Loud Therapy as he doesn't have anyone taking him to appointments.  She is not sure if [facility].  He is going to the gym.    He uses a cane.  He reports tremors are not too bad.  He gets assistance when he showers.  He has freezing of gait.  He said, \"it's pretty good now.\"     He has some visual hallucinations that are \"insignificant.\"  He gave an example of seeing \"furry critters.\" This is not bothersome to him.  Wife reports that since he was started on Seroquel, hallucinations have improved.      He has dementia.  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     Wife reports that they monitor his BP closely.    He has been seen by a psychiatrist twice; on 2/28 & 3/7.  He was recently stared on Escitalopram 10 mg in the morning to manage depression in addition to Mirtazapine 30 mg at HS.  He reports depression is improving.     His wife plans to take him to the hotel where she's staying for a few days.  She wants to take him to the heated pool.  She asked if this is okay.       MEDICATIONS:   Medication Sig     acetaminophen (TYLENOL) 325 MG tablet 2 x 325mg tabs by mouth twice daily - morning and evening     carbidopa-levodopa (SINEMET)  MG per tablet 2 tabs by mouth 3/day @ 7/8am, 1pm and 7/8pm = 6/day     Carboxymethylcellulose Sod PF (REFRESH PLUS) 0.5 % SOLN ophthalmic solution 1 drop in both eyes every 6 hours as needed     cyanocobalamin (VITAMIN  B-12) 100 MCG TABS tablet Take 1 tablet (100 mcg) by mouth daily     desonide (DESOWEN) " "0.05 % cream Apply 0.5 inches topically 2 times daily.     donepezil (ARICEPT) 10 MG tablet Take 10 mg by mouth At Bedtime      ESCITALOPRAM OXALATE PO Take 10 mg by mouth daily     Hydrocortisone (SCALP RELIEF ANTI-ITCH) 1 % SOLN Externally apply  topically as needed.     ketoconazole (NIZORAL) 2 % cream Apply  topically two times a day as needed (To seborrheic dermatitis of the scalp and nose area for maintenance.).     ketoconazole (NIZORAL) 2 % shampoo Apply  topically daily as needed for itching. Apply to the affected area and wash off after 5 minutes     metoprolol succinate (TOPROL-XL) 12.5 mg TABS half-tab Take by mouth daily     mirtazapine (REMERON) 30 MG tablet 30mg by mouth at bedtime     NONFORMULARY Muscle rub cream 10-15% apply to left shoulder at bedtime     nystatin (MYCOSTATIN) ointment Apply to scrotum every 8 hours as needed     QUEtiapine (SEROQUEL) 25 MG tablet Take 25 mg by mouth At Bedtime      sodium chloride (OCEAN) 0.65 % nasal spray 2 sprays in nose twice daily for dry nose     triamcinolone (KENALOG) 0.5 % cream Apply to face twice daily as needed     vitamin D3 (CHOLECALCIFEROL) 85501 units capsule 50,000 units by mouth on the 5th day of every month     ALLERGIES: Atorvastatin; Pramipexole; Simvastatin; and Yellow dye    PHYSICAL EXAM:    VITAL SIGNS:  Blood pressure 127/82, pulse 68, temperature 97.8  F (36.6  C), temperature source Oral, resp. rate 16, height 1.753 m (5' 9\"), weight 66.7 kg (147 lb), SpO2 97 %. Body mass index is 21.71 kg/m .    GENERAL:  Mr. Bates is a pleasant  male who is well-groomed, well-developed and thin sitting comfortably in the exam room without any distress.  Affect is appropriate.    MOVEMENT DISORDERS ASSESSMENT: (Last Sinemet was unknown)  Speech: Monotone, slurred but understandable; moderately impaired.  Facial Expression: Slight, abnormal diminution of facial expression.  Rest Tremor: Mild in chin, RLE & LLE that worsen with mental " activation.   Action/Postural Tremor: Slight postural tremors and mild action tremors bilaterally.  Rigidity:  Increased tone; mild to moderate in all extremities.   Finger Taps: Mild slowing and reduction in amplitude in Rt; Moderately impaired in Lt.    Hand opening & closing: Moderately impaired; occasional arrests in movement bilaterally.    Hand pronation & supination: Moderately impaired in Rt; Severely impaired in Lt.   Toe Tapping:  Mild slowing and reduction in amplitude in Rt; Moderately impaired in Lt.    Leg Agility: Mild slowing and reduction in amplitude bilaterally.    Arising from chair - arms folded across chest: Pushes self up from arms of seat.  Posture: Moderately stooped posture, slightly leaning to one side.  Gait: Walks slowly, shuffles with short steps. No festination or propulsion.  Freezing of gait:  None noted.   Postural Stability: Absence of postural response; would fall if not caught by examiner.  Body Bradykinesia: Mild degree of slowness and poverty of movement.   Dyskinesias:  Absent.     ASSESSMENT:    1)  Parkinson's Disease:  Patient has a 6 - 7 year history of PD.  Motor symptoms are stable.     2)  Visual hallucinations:  Manageable on Seroquel 25 mg at HS.      3)  Depression:  Seeing a psychiatrist.  He was on Mirtazapine 30 mg and recently stared on Escitalopram 10 mg.     4)  Dementia:  Gradually worsening.  [facility]  On Donepezil.  No behavioral issues.       PLAN:    __  Will stay on the same antiparkinsonian medications.   __  Will continue on the same dose of Quetiapine 25 mg at HS.    __  Will follow up with psychiatrist to manage mood.   __  Will stay on the same dose of Donepezil 10 mg at HS.   __  Encouraged to continue staying active.  __  Okay to go out with wife & walk in a warm swimming pool.     Will return to our clinic in 6 months or sooner as needed.    The total time spent with the patient was 45 minutes, and greater than 50% of this time was  spent in counseling and coordination of care.    SHARATH Coon,  CNP  [facility] Neurology [facility]    10:31 AM  11:16 AM

## 2019-04-01 ENCOUNTER — DOCUMENTATION ONLY (OUTPATIENT)
Dept: OTHER | Facility: CLINIC | Age: 69
End: 2019-04-01

## 2019-04-01 ENCOUNTER — AMBULATORY - HEALTHEAST (OUTPATIENT)
Dept: OTHER | Facility: CLINIC | Age: 69
End: 2019-04-01

## 2019-07-30 ENCOUNTER — RECORDS - HEALTHEAST (OUTPATIENT)
Dept: LAB | Facility: CLINIC | Age: 69
End: 2019-07-30

## 2019-07-30 LAB
CHOLEST SERPL-MCNC: 183 MG/DL
FASTING STATUS PATIENT QL REPORTED: YES
HBA1C MFR BLD: 5.7 % (ref 4.2–6.1)
HDLC SERPL-MCNC: 49 MG/DL
LDLC SERPL CALC-MCNC: 114 MG/DL
TRIGL SERPL-MCNC: 100 MG/DL

## 2019-10-14 ENCOUNTER — RECORDS - HEALTHEAST (OUTPATIENT)
Dept: LAB | Facility: CLINIC | Age: 69
End: 2019-10-14

## 2019-10-16 LAB — 25(OH)D3 SERPL-MCNC: 38.9 NG/ML (ref 30–80)

## 2020-07-14 ENCOUNTER — RECORDS - HEALTHEAST (OUTPATIENT)
Dept: LAB | Facility: CLINIC | Age: 70
End: 2020-07-14

## 2020-07-15 LAB
ANION GAP SERPL CALCULATED.3IONS-SCNC: 10 MMOL/L (ref 5–18)
BASOPHILS # BLD AUTO: 0 THOU/UL (ref 0–0.2)
BASOPHILS NFR BLD AUTO: 1 % (ref 0–2)
BUN SERPL-MCNC: 19 MG/DL (ref 8–28)
CALCIUM SERPL-MCNC: 8.9 MG/DL (ref 8.5–10.5)
CHLORIDE BLD-SCNC: 106 MMOL/L (ref 98–107)
CO2 SERPL-SCNC: 27 MMOL/L (ref 22–31)
CREAT SERPL-MCNC: 0.93 MG/DL (ref 0.7–1.3)
EOSINOPHIL # BLD AUTO: 0.3 THOU/UL (ref 0–0.4)
EOSINOPHIL NFR BLD AUTO: 3 % (ref 0–6)
ERYTHROCYTE [DISTWIDTH] IN BLOOD BY AUTOMATED COUNT: 13.4 % (ref 11–14.5)
GFR SERPL CREATININE-BSD FRML MDRD: >60 ML/MIN/1.73M2
GLUCOSE BLD-MCNC: 128 MG/DL (ref 70–125)
HCT VFR BLD AUTO: 30.8 % (ref 40–54)
HGB BLD-MCNC: 9.7 G/DL (ref 14–18)
LYMPHOCYTES # BLD AUTO: 1 THOU/UL (ref 0.8–4.4)
LYMPHOCYTES NFR BLD AUTO: 11 % (ref 20–40)
MCH RBC QN AUTO: 30 PG (ref 27–34)
MCHC RBC AUTO-ENTMCNC: 31.5 G/DL (ref 32–36)
MCV RBC AUTO: 95 FL (ref 80–100)
MONOCYTES # BLD AUTO: 0.4 THOU/UL (ref 0–0.9)
MONOCYTES NFR BLD AUTO: 5 % (ref 2–10)
NEUTROPHILS # BLD AUTO: 7.1 THOU/UL (ref 2–7.7)
NEUTROPHILS NFR BLD AUTO: 80 % (ref 50–70)
PLATELET # BLD AUTO: 566 THOU/UL (ref 140–440)
PMV BLD AUTO: 11 FL (ref 8.5–12.5)
POTASSIUM BLD-SCNC: 4.2 MMOL/L (ref 3.5–5)
RBC # BLD AUTO: 3.23 MILL/UL (ref 4.4–6.2)
SODIUM SERPL-SCNC: 143 MMOL/L (ref 136–145)
WBC: 8.8 THOU/UL (ref 4–11)

## 2020-08-13 ENCOUNTER — RECORDS - HEALTHEAST (OUTPATIENT)
Dept: LAB | Facility: CLINIC | Age: 70
End: 2020-08-13

## 2020-08-13 LAB
ANION GAP SERPL CALCULATED.3IONS-SCNC: 9 MMOL/L (ref 5–18)
BASOPHILS # BLD AUTO: 0 THOU/UL (ref 0–0.2)
BASOPHILS NFR BLD AUTO: 0 % (ref 0–2)
BUN SERPL-MCNC: 18 MG/DL (ref 8–28)
CALCIUM SERPL-MCNC: 8.8 MG/DL (ref 8.5–10.5)
CHLORIDE BLD-SCNC: 105 MMOL/L (ref 98–107)
CO2 SERPL-SCNC: 26 MMOL/L (ref 22–31)
CREAT SERPL-MCNC: 1.06 MG/DL (ref 0.7–1.3)
EOSINOPHIL # BLD AUTO: 0.1 THOU/UL (ref 0–0.4)
EOSINOPHIL NFR BLD AUTO: 1 % (ref 0–6)
ERYTHROCYTE [DISTWIDTH] IN BLOOD BY AUTOMATED COUNT: 12.7 % (ref 11–14.5)
GFR SERPL CREATININE-BSD FRML MDRD: >60 ML/MIN/1.73M2
GLUCOSE BLD-MCNC: 127 MG/DL (ref 70–125)
HCT VFR BLD AUTO: 35 % (ref 40–54)
HGB BLD-MCNC: 10.9 G/DL (ref 14–18)
LYMPHOCYTES # BLD AUTO: 1 THOU/UL (ref 0.8–4.4)
LYMPHOCYTES NFR BLD AUTO: 14 % (ref 20–40)
MCH RBC QN AUTO: 29.3 PG (ref 27–34)
MCHC RBC AUTO-ENTMCNC: 31.1 G/DL (ref 32–36)
MCV RBC AUTO: 94 FL (ref 80–100)
MONOCYTES # BLD AUTO: 0.4 THOU/UL (ref 0–0.9)
MONOCYTES NFR BLD AUTO: 6 % (ref 2–10)
NEUTROPHILS # BLD AUTO: 5.8 THOU/UL (ref 2–7.7)
NEUTROPHILS NFR BLD AUTO: 79 % (ref 50–70)
PLATELET # BLD AUTO: 357 THOU/UL (ref 140–440)
PMV BLD AUTO: 11.2 FL (ref 8.5–12.5)
POTASSIUM BLD-SCNC: 4.5 MMOL/L (ref 3.5–5)
PROCALCITONIN SERPL-MCNC: 0.04 NG/ML (ref 0–0.49)
RBC # BLD AUTO: 3.72 MILL/UL (ref 4.4–6.2)
SODIUM SERPL-SCNC: 140 MMOL/L (ref 136–145)
WBC: 7.3 THOU/UL (ref 4–11)

## 2020-10-20 ENCOUNTER — RECORDS - HEALTHEAST (OUTPATIENT)
Dept: LAB | Facility: CLINIC | Age: 70
End: 2020-10-20

## 2020-10-20 LAB
SARS-COV-2 PCR COMMENT: NORMAL
SARS-COV-2 RNA SPEC QL NAA+PROBE: NEGATIVE
SARS-COV-2 VIRUS SPECIMEN SOURCE: NORMAL

## 2020-12-25 ENCOUNTER — RECORDS - HEALTHEAST (OUTPATIENT)
Dept: LAB | Facility: CLINIC | Age: 70
End: 2020-12-25

## 2021-02-04 ENCOUNTER — RECORDS - HEALTHEAST (OUTPATIENT)
Dept: LAB | Facility: CLINIC | Age: 71
End: 2021-02-04

## 2021-02-12 ENCOUNTER — RECORDS - HEALTHEAST (OUTPATIENT)
Dept: LAB | Facility: CLINIC | Age: 71
End: 2021-02-12

## 2021-02-19 ENCOUNTER — RECORDS - HEALTHEAST (OUTPATIENT)
Dept: LAB | Facility: CLINIC | Age: 71
End: 2021-02-19

## 2021-02-26 ENCOUNTER — RECORDS - HEALTHEAST (OUTPATIENT)
Dept: LAB | Facility: CLINIC | Age: 71
End: 2021-02-26

## 2021-03-05 ENCOUNTER — RECORDS - HEALTHEAST (OUTPATIENT)
Dept: LAB | Facility: CLINIC | Age: 71
End: 2021-03-05

## 2021-04-06 ENCOUNTER — RECORDS - HEALTHEAST (OUTPATIENT)
Dept: LAB | Facility: CLINIC | Age: 71
End: 2021-04-06

## 2021-04-15 ENCOUNTER — RECORDS - HEALTHEAST (OUTPATIENT)
Dept: LAB | Facility: CLINIC | Age: 71
End: 2021-04-15

## 2021-04-22 ENCOUNTER — RECORDS - HEALTHEAST (OUTPATIENT)
Dept: LAB | Facility: CLINIC | Age: 71
End: 2021-04-22

## 2021-04-29 ENCOUNTER — RECORDS - HEALTHEAST (OUTPATIENT)
Dept: LAB | Facility: CLINIC | Age: 71
End: 2021-04-29

## 2021-05-06 ENCOUNTER — RECORDS - HEALTHEAST (OUTPATIENT)
Dept: LAB | Facility: CLINIC | Age: 71
End: 2021-05-06

## 2021-08-10 ENCOUNTER — LAB REQUISITION (OUTPATIENT)
Dept: LAB | Facility: CLINIC | Age: 71
End: 2021-08-10
Payer: MEDICARE

## 2021-08-10 DIAGNOSIS — U07.1 COVID-19: ICD-10-CM

## 2021-08-11 PROCEDURE — U0005 INFEC AGEN DETEC AMPLI PROBE: HCPCS

## 2021-08-12 LAB — SARS-COV-2 RNA RESP QL NAA+PROBE: NEGATIVE

## 2021-08-17 ENCOUNTER — LAB REQUISITION (OUTPATIENT)
Dept: LAB | Facility: CLINIC | Age: 71
End: 2021-08-17
Payer: MEDICARE

## 2021-08-18 PROCEDURE — U0005 INFEC AGEN DETEC AMPLI PROBE: HCPCS

## 2021-08-19 LAB — SARS-COV-2 RNA RESP QL NAA+PROBE: NEGATIVE

## 2021-08-24 ENCOUNTER — LAB REQUISITION (OUTPATIENT)
Dept: LAB | Facility: CLINIC | Age: 71
End: 2021-08-24
Payer: MEDICARE

## 2021-08-25 PROCEDURE — U0003 INFECTIOUS AGENT DETECTION BY NUCLEIC ACID (DNA OR RNA); SEVERE ACUTE RESPIRATORY SYNDROME CORONAVIRUS 2 (SARS-COV-2) (CORONAVIRUS DISEASE [COVID-19]), AMPLIFIED PROBE TECHNIQUE, MAKING USE OF HIGH THROUGHPUT TECHNOLOGIES AS DESCRIBED BY CMS-2020-01-R: HCPCS

## 2021-08-26 LAB — SARS-COV-2 RNA RESP QL NAA+PROBE: NEGATIVE

## 2021-08-27 ENCOUNTER — LAB REQUISITION (OUTPATIENT)
Dept: LAB | Facility: CLINIC | Age: 71
End: 2021-08-27
Payer: MEDICARE

## 2021-09-01 PROCEDURE — U0005 INFEC AGEN DETEC AMPLI PROBE: HCPCS

## 2021-09-02 LAB — SARS-COV-2 RNA RESP QL NAA+PROBE: NEGATIVE

## 2021-09-03 ENCOUNTER — LAB REQUISITION (OUTPATIENT)
Dept: LAB | Facility: CLINIC | Age: 71
End: 2021-09-03
Payer: MEDICARE

## 2021-09-08 ENCOUNTER — LAB REQUISITION (OUTPATIENT)
Dept: LAB | Facility: CLINIC | Age: 71
End: 2021-09-08
Payer: MEDICARE

## 2021-09-08 PROCEDURE — U0005 INFEC AGEN DETEC AMPLI PROBE: HCPCS

## 2021-09-09 LAB — SARS-COV-2 RNA RESP QL NAA+PROBE: NEGATIVE

## 2021-09-15 PROCEDURE — U0005 INFEC AGEN DETEC AMPLI PROBE: HCPCS

## 2021-09-16 LAB — SARS-COV-2 RNA RESP QL NAA+PROBE: NEGATIVE

## 2021-09-20 ENCOUNTER — LAB REQUISITION (OUTPATIENT)
Dept: LAB | Facility: CLINIC | Age: 71
End: 2021-09-20
Payer: MEDICARE

## 2021-09-22 PROCEDURE — U0003 INFECTIOUS AGENT DETECTION BY NUCLEIC ACID (DNA OR RNA); SEVERE ACUTE RESPIRATORY SYNDROME CORONAVIRUS 2 (SARS-COV-2) (CORONAVIRUS DISEASE [COVID-19]), AMPLIFIED PROBE TECHNIQUE, MAKING USE OF HIGH THROUGHPUT TECHNOLOGIES AS DESCRIBED BY CMS-2020-01-R: HCPCS

## 2021-09-23 LAB — SARS-COV-2 RNA RESP QL NAA+PROBE: NEGATIVE
